# Patient Record
Sex: FEMALE | Race: WHITE | Employment: OTHER | URBAN - METROPOLITAN AREA
[De-identification: names, ages, dates, MRNs, and addresses within clinical notes are randomized per-mention and may not be internally consistent; named-entity substitution may affect disease eponyms.]

---

## 2023-03-07 DIAGNOSIS — M86.9: Primary | ICD-10-CM

## 2023-03-07 RX ORDER — OXYCODONE HYDROCHLORIDE 5 MG/1
10 CAPSULE ORAL EVERY 8 HOURS PRN
COMMUNITY
End: 2023-03-07 | Stop reason: SDUPTHER

## 2023-03-07 RX ORDER — OXYCODONE HYDROCHLORIDE 5 MG/1
10 CAPSULE ORAL EVERY 8 HOURS PRN
Qty: 180 CAPSULE | Refills: 0 | Status: SHIPPED | OUTPATIENT
Start: 2023-03-07 | End: 2023-05-09 | Stop reason: ALTCHOICE

## 2023-03-09 ENCOUNTER — NURSING HOME VISIT (OUTPATIENT)
Dept: POST ACUTE CARE | Facility: EXTERNAL LOCATION | Age: 68
End: 2023-03-09
Payer: MEDICARE

## 2023-03-09 VITALS
TEMPERATURE: 97.9 F | OXYGEN SATURATION: 98 % | SYSTOLIC BLOOD PRESSURE: 120 MMHG | HEART RATE: 75 BPM | WEIGHT: 182 LBS | RESPIRATION RATE: 18 BRPM | DIASTOLIC BLOOD PRESSURE: 69 MMHG | BODY MASS INDEX: 28.51 KG/M2

## 2023-03-09 DIAGNOSIS — E87.6 HYPOKALEMIA: ICD-10-CM

## 2023-03-09 DIAGNOSIS — K26.4 GASTROINTESTINAL HEMORRHAGE ASSOCIATED WITH DUODENAL ULCER: ICD-10-CM

## 2023-03-09 DIAGNOSIS — I48.0 PAROXYSMAL ATRIAL FIBRILLATION (MULTI): ICD-10-CM

## 2023-03-09 DIAGNOSIS — Z95.1 S/P CABG (CORONARY ARTERY BYPASS GRAFT): ICD-10-CM

## 2023-03-09 DIAGNOSIS — M86.9: Primary | ICD-10-CM

## 2023-03-09 PROBLEM — K92.2 GI BLEED: Status: ACTIVE | Noted: 2023-03-09

## 2023-03-09 PROCEDURE — 99309 SBSQ NF CARE MODERATE MDM 30: CPT | Performed by: NURSE PRACTITIONER

## 2023-03-09 ASSESSMENT — ENCOUNTER SYMPTOMS
VOMITING: 0
SHORTNESS OF BREATH: 0
FEVER: 0
ABDOMINAL PAIN: 0
DIARRHEA: 0
COUGH: 0
CHILLS: 0
NAUSEA: 0
CONSTIPATION: 0
PALPITATIONS: 0
DIFFICULTY URINATING: 0
FATIGUE: 0

## 2023-03-09 NOTE — LETTER
Subjective  Rupa Pinedo is a 67 y.o. female Here for weekly skilled visit.  Health problems reviewed and no acute concerns.  Tolerating IV antibiotics without apparent side effects.   Eating and drinking well.  she denies any complaints of pain.  No concerns per staff.      HPI   Dr Frias has been forwarded pictures through [facility] and plan is to continue atb and will reevaluate next week.      Review of Systems   Constitutional:  Negative for chills, fatigue and fever.   Respiratory:  Negative for cough and shortness of breath.    Cardiovascular:  Negative for chest pain and palpitations.   Gastrointestinal:  Negative for abdominal pain, constipation, diarrhea, nausea and vomiting.   Genitourinary:  Negative for difficulty urinating.       Objective  /69   Pulse 75   Temp 36.6 °C (97.9 °F)   Resp 18   Wt 82.6 kg (182 lb)   SpO2 98%   BMI 28.51 kg/m²     Physical Exam  Constitutional:       General: She is not in acute distress.     Appearance: Normal appearance.   HENT:      Head: Normocephalic and atraumatic.   Eyes:      Conjunctiva/sclera: Conjunctivae normal.   Cardiovascular:      Rate and Rhythm: Normal rate and regular rhythm.   Pulmonary:      Effort: Pulmonary effort is normal. No respiratory distress.      Breath sounds: Normal breath sounds.      Comments: Slightly diminshed t/o  Abdominal:      General: Bowel sounds are normal. There is no distension.      Palpations: Abdomen is soft.      Tenderness: There is no abdominal tenderness.   Musculoskeletal:         General: Normal range of motion.      Right lower leg: Edema (2+) present.      Left lower leg: Edema (2+) present.   Skin:     General: Skin is warm and dry.      Comments: Sternal wound vac intact.    Neurological:      General: No focal deficit present.      Mental Status: She is alert and oriented to person, place, and time.   Psychiatric:         Mood and Affect: Mood normal.         Behavior: Behavior normal.          Assessment/Plan  Problem List Items Addressed This Visit          Circulatory    S/P CABG (coronary artery bypass graft)     Follow up cardiology and CTS as scheduled.           Paroxysmal atrial fibrillation (CMS/HCC)     Rate controlled,  anticoagulants discontinued due to gi bleed.  On metoprolol.             Digestive    GI bleed     No known recurrence, hgb stable on weekly labs. Staff to monitor and notify for any recurrence of symptoms.                Musculoskeletal    Sternal osteomyelitis (CMS/HCC) - Primary     Continue with atb and weekly labs per ID.     Dr Frias to determine stop date of atb.   Wound is decreasing in size    No pain.             Other    Hypokalemia     Potassium 3.5 today, goal potassium will be around 4.  Will given an extra 10 meq of kcl for 2 days and will have repeat potassium 3/13 with weekly labs.             labs/meds/orders reviewed  staff to monitor and notify for any changes.  Continue with IV atb as scheduled, stop date to be determined by Dr Frias.     Sandra SILVESTRE

## 2023-03-10 NOTE — ASSESSMENT & PLAN NOTE
Potassium 3.5 today, goal potassium will be around 4.  Will given an extra 10 meq of kcl for 2 days and will have repeat potassium 3/13 with weekly labs.

## 2023-03-10 NOTE — ASSESSMENT & PLAN NOTE
Continue with atb and weekly labs per ID.     Dr Frias to determine stop date of atb.   Wound is decreasing in size    No pain.

## 2023-03-10 NOTE — ASSESSMENT & PLAN NOTE
No known recurrence, hgb stable on weekly labs. Staff to monitor and notify for any recurrence of symptoms.

## 2023-03-16 ENCOUNTER — NURSING HOME VISIT (OUTPATIENT)
Dept: POST ACUTE CARE | Facility: EXTERNAL LOCATION | Age: 68
End: 2023-03-16
Payer: MEDICARE

## 2023-03-16 VITALS
HEART RATE: 75 BPM | RESPIRATION RATE: 18 BRPM | SYSTOLIC BLOOD PRESSURE: 120 MMHG | DIASTOLIC BLOOD PRESSURE: 69 MMHG | WEIGHT: 182 LBS | OXYGEN SATURATION: 97 % | BODY MASS INDEX: 28.51 KG/M2 | TEMPERATURE: 97.5 F

## 2023-03-16 DIAGNOSIS — K26.4 GASTROINTESTINAL HEMORRHAGE ASSOCIATED WITH DUODENAL ULCER: ICD-10-CM

## 2023-03-16 DIAGNOSIS — Z95.1 S/P CABG (CORONARY ARTERY BYPASS GRAFT): ICD-10-CM

## 2023-03-16 DIAGNOSIS — I48.0 PAROXYSMAL ATRIAL FIBRILLATION (MULTI): Primary | ICD-10-CM

## 2023-03-16 DIAGNOSIS — M86.9: ICD-10-CM

## 2023-03-16 DIAGNOSIS — E87.6 HYPOKALEMIA: ICD-10-CM

## 2023-03-16 PROCEDURE — 99309 SBSQ NF CARE MODERATE MDM 30: CPT | Performed by: NURSE PRACTITIONER

## 2023-03-16 ASSESSMENT — ENCOUNTER SYMPTOMS
FATIGUE: 0
DIFFICULTY URINATING: 0
CHILLS: 0
NAUSEA: 0
SHORTNESS OF BREATH: 0
FEVER: 0
PALPITATIONS: 0
VOMITING: 0
COUGH: 0
CONSTIPATION: 0
DIARRHEA: 0
ABDOMINAL PAIN: 0

## 2023-03-16 NOTE — ASSESSMENT & PLAN NOTE
No known recurrence  Hgb stable  on labs.  Staff to monitor and notify for any recurrence of symptoms.

## 2023-03-16 NOTE — ASSESSMENT & PLAN NOTE
Continue with atb and weekly labs per ID. Dr Frias to determine stop date of atb, this week adjusted to 3/31. . Wound is decreasing in size . No pain.

## 2023-03-16 NOTE — PROGRESS NOTES
Subjective   Rupa Pinedo is a 67 y.o. female Here for weekly skilled visit.  Health problems reviewed and no acute concerns.  Tolerating IV antibiotics without apparent side effects.   Eating and drinking well.  she denies any complaints of pain.  No concerns per staff.      HPI   Dr Frias has been forwarded pictures through [facility] and plan is to continue atb through 3/31.  Wound measurements continue to decrease.   Potassium 3.6 earlier this week,  BMP pending for today.     Review of Systems   Constitutional:  Negative for chills, fatigue and fever.   Respiratory:  Negative for cough and shortness of breath.    Cardiovascular:  Negative for chest pain and palpitations.   Gastrointestinal:  Negative for abdominal pain, constipation, diarrhea, nausea and vomiting.   Genitourinary:  Negative for difficulty urinating.       Objective   /69   Pulse 75   Temp 36.4 °C (97.5 °F)   Resp 18   Wt 82.6 kg (182 lb)   SpO2 97%   BMI 28.51 kg/m²     Physical Exam  Constitutional:       General: She is not in acute distress.     Appearance: Normal appearance.   HENT:      Head: Normocephalic and atraumatic.   Eyes:      Conjunctiva/sclera: Conjunctivae normal.   Cardiovascular:      Rate and Rhythm: Normal rate and regular rhythm.   Pulmonary:      Effort: Pulmonary effort is normal. No respiratory distress.      Breath sounds: Normal breath sounds.      Comments: Slightly diminshed t/o  Abdominal:      General: Bowel sounds are normal. There is no distension.      Palpations: Abdomen is soft.      Tenderness: There is no abdominal tenderness.   Musculoskeletal:         General: Normal range of motion.      Right lower leg: Edema (2+) present.      Left lower leg: Edema (2+) present.   Skin:     General: Skin is warm and dry.      Comments: Sternal wound vac intact.    Neurological:      General: No focal deficit present.      Mental Status: She is alert and oriented to person, place, and time.   Psychiatric:          Mood and Affect: Mood normal.         Behavior: Behavior normal.         Assessment/Plan   Problem List Items Addressed This Visit          Circulatory    S/P CABG (coronary artery bypass graft)     Follow up cardiology and CTS as scheduled.           Paroxysmal atrial fibrillation (CMS/HCC) - Primary       Digestive    GI bleed     No known recurrence  Hgb stable  on labs.  Staff to monitor and notify for any recurrence of symptoms.              Musculoskeletal    Sternal osteomyelitis (CMS/HCC)     Continue with atb and weekly labs per ID. Dr Frias to determine stop date of atb, this week adjusted to 3/31. . Wound is decreasing in size . No pain.             Other    Hypokalemia     Goal potassium around 4, earlier this week up to 3.6, repeat BMP pending for today.           labs/meds/orders reviewed  staff to monitor and notify for any changes.  Continue with IV atb as scheduled, stop date to be determined by Dr Frias, currently to continue through 3/31     Sandra SILVESTRE

## 2023-03-16 NOTE — LETTER
Patient: Rupa Pinedo  : 1955    Encounter Date: 2023    Subjective  Rupa Pinedo is a 67 y.o. female Here for weekly skilled visit.  Health problems reviewed and no acute concerns.  Tolerating IV antibiotics without apparent side effects.   Eating and drinking well.  she denies any complaints of pain.  No concerns per staff.      HPI   Dr Frias has been forwarded pictures through [facility] and plan is to continue atb through 3/31.  Wound measurements continue to decrease.   Potassium 3.6 earlier this week,  BMP pending for today.     Review of Systems   Constitutional:  Negative for chills, fatigue and fever.   Respiratory:  Negative for cough and shortness of breath.    Cardiovascular:  Negative for chest pain and palpitations.   Gastrointestinal:  Negative for abdominal pain, constipation, diarrhea, nausea and vomiting.   Genitourinary:  Negative for difficulty urinating.       Objective  /69   Pulse 75   Temp 36.4 °C (97.5 °F)   Resp 18   Wt 82.6 kg (182 lb)   SpO2 97%   BMI 28.51 kg/m²     Physical Exam  Constitutional:       General: She is not in acute distress.     Appearance: Normal appearance.   HENT:      Head: Normocephalic and atraumatic.   Eyes:      Conjunctiva/sclera: Conjunctivae normal.   Cardiovascular:      Rate and Rhythm: Normal rate and regular rhythm.   Pulmonary:      Effort: Pulmonary effort is normal. No respiratory distress.      Breath sounds: Normal breath sounds.      Comments: Slightly diminshed t/o  Abdominal:      General: Bowel sounds are normal. There is no distension.      Palpations: Abdomen is soft.      Tenderness: There is no abdominal tenderness.   Musculoskeletal:         General: Normal range of motion.      Right lower leg: Edema (2+) present.      Left lower leg: Edema (2+) present.   Skin:     General: Skin is warm and dry.      Comments: Sternal wound vac intact.    Neurological:      General: No focal deficit present.      Mental Status: She is  alert and oriented to person, place, and time.   Psychiatric:         Mood and Affect: Mood normal.         Behavior: Behavior normal.         Assessment/Plan  Problem List Items Addressed This Visit          Circulatory    S/P CABG (coronary artery bypass graft)     Follow up cardiology and CTS as scheduled.           Paroxysmal atrial fibrillation (CMS/HCC) - Primary       Digestive    GI bleed     No known recurrence  Hgb stable  on labs.  Staff to monitor and notify for any recurrence of symptoms.              Musculoskeletal    Sternal osteomyelitis (CMS/HCC)     Continue with atb and weekly labs per ID. Dr Frias to determine stop date of atb, this week adjusted to 3/31. . Wound is decreasing in size . No pain.             Other    Hypokalemia     Goal potassium around 4, earlier this week up to 3.6, repeat BMP pending for today.           labs/meds/orders reviewed  staff to monitor and notify for any changes.  Continue with IV atb as scheduled, stop date to be determined by Dr Frias, currently to continue through 3/31     Sandra SILVESTRE      Electronically Signed By: NIRMALA Nguyen-CNP   3/16/23 12:11 PM

## 2023-03-30 ENCOUNTER — NURSING HOME VISIT (OUTPATIENT)
Dept: POST ACUTE CARE | Facility: EXTERNAL LOCATION | Age: 68
End: 2023-03-30
Payer: MEDICARE

## 2023-03-30 VITALS
DIASTOLIC BLOOD PRESSURE: 63 MMHG | WEIGHT: 182 LBS | OXYGEN SATURATION: 97 % | TEMPERATURE: 97.6 F | BODY MASS INDEX: 28.51 KG/M2 | RESPIRATION RATE: 20 BRPM | SYSTOLIC BLOOD PRESSURE: 128 MMHG | HEART RATE: 69 BPM

## 2023-03-30 DIAGNOSIS — M86.9: Primary | ICD-10-CM

## 2023-03-30 DIAGNOSIS — E87.6 HYPOKALEMIA: ICD-10-CM

## 2023-03-30 DIAGNOSIS — K26.4 GASTROINTESTINAL HEMORRHAGE ASSOCIATED WITH DUODENAL ULCER: ICD-10-CM

## 2023-03-30 DIAGNOSIS — Z95.1 S/P CABG (CORONARY ARTERY BYPASS GRAFT): ICD-10-CM

## 2023-03-30 DIAGNOSIS — I48.0 PAROXYSMAL ATRIAL FIBRILLATION (MULTI): ICD-10-CM

## 2023-03-30 PROCEDURE — 99309 SBSQ NF CARE MODERATE MDM 30: CPT | Performed by: NURSE PRACTITIONER

## 2023-03-30 ASSESSMENT — ENCOUNTER SYMPTOMS
VOMITING: 0
SHORTNESS OF BREATH: 0
CHILLS: 0
DIARRHEA: 0
CONSTIPATION: 0
ABDOMINAL PAIN: 0
COUGH: 0
FEVER: 0
PALPITATIONS: 0
NAUSEA: 0
DIFFICULTY URINATING: 0
FATIGUE: 0

## 2023-03-30 NOTE — ASSESSMENT & PLAN NOTE
Continue with atb and weekly labs per ID. Dr Frias to determine stop date of atb, this week daptomycin will be completed 3/31, cefepime will continue tentatively through 4/7 . Wound is decreasing in size . No pain.

## 2023-03-30 NOTE — LETTER
Patient: Rupa Pinedo  : 1955    Encounter Date: 2023    Subjective  Rupa Pinedo is a 67 y.o. female Here for weekly skilled visit.  Health problems reviewed and no acute concerns.  Tolerating IV antibiotics without apparent side effects.   Eating and drinking well.  she denies any complaints of pain.  No concerns per staff.      HPI   Dr Frias has been forwarded pictures through [facility] and plan is to continue cefeprime tentaively through , daptomycin will be completed 3/31.  Wound measurements continue to decrease.   Potassium 3.2earlier this week, given extra potassium, BMP pending for today.     Review of Systems   Constitutional:  Negative for chills, fatigue and fever.   Respiratory:  Negative for cough and shortness of breath.    Cardiovascular:  Negative for chest pain and palpitations.   Gastrointestinal:  Negative for abdominal pain, constipation, diarrhea, nausea and vomiting.   Genitourinary:  Negative for difficulty urinating.       Objective  /63   Pulse 69   Temp 36.4 °C (97.6 °F)   Resp 20   Wt 82.6 kg (182 lb)   SpO2 97%   BMI 28.51 kg/m²     Physical Exam  Constitutional:       General: She is not in acute distress.     Appearance: Normal appearance.   HENT:      Head: Normocephalic and atraumatic.   Eyes:      Conjunctiva/sclera: Conjunctivae normal.   Cardiovascular:      Rate and Rhythm: Normal rate and regular rhythm.   Pulmonary:      Effort: Pulmonary effort is normal. No respiratory distress.      Breath sounds: Normal breath sounds.      Comments: Slightly diminshed t/o  Abdominal:      General: Bowel sounds are normal. There is no distension.      Palpations: Abdomen is soft.      Tenderness: There is no abdominal tenderness.   Musculoskeletal:         General: Normal range of motion.      Right lower leg: Edema (2+) present.      Left lower leg: Edema (2+) present.   Skin:     General: Skin is warm and dry.      Comments: Sternal wound vac intact.     Neurological:      General: No focal deficit present.      Mental Status: She is alert and oriented to person, place, and time.   Psychiatric:         Mood and Affect: Mood normal.         Behavior: Behavior normal.         Assessment/Plan  Problem List Items Addressed This Visit          Circulatory    S/P CABG (coronary artery bypass graft)     Follow up cardiology and CTS as scheduled.           Paroxysmal atrial fibrillation (CMS/HCC)     Rate controlled,  anticoagulants discontinued due to gi bleed.  On metoprolol.             Digestive    GI bleed       Musculoskeletal    Sternal osteomyelitis (CMS/HCC) - Primary     Continue with atb and weekly labs per ID. Dr Frias to determine stop date of atb, this week daptomycin will be completed 3/31, cefepime will continue tentatively through 4/7 . Wound is decreasing in size . No pain.             Other    Hypokalemia     Goal potassium around 4,, earlier this week dropped to 3.2 and given extra potassium, repeat pending for today           labs/meds/orders reviewed  staff to monitor and notify for any changes.  Continue with IV atb as scheduled, stop date to be determined by Dr Frias, currently to continue daptomycin through 3/31  and cefipime tentatively complete through 4/7.           Electronically Signed By: NIRMALA Nguyen-CNP   3/30/23 11:41 AM

## 2023-03-30 NOTE — PROGRESS NOTES
Subjective   Rupa Pinedo is a 67 y.o. female Here for weekly skilled visit.  Health problems reviewed and no acute concerns.  Tolerating IV antibiotics without apparent side effects.   Eating and drinking well.  she denies any complaints of pain.  No concerns per staff.      HPI   Dr Frias has been forwarded pictures through [facility] and plan is to continue cefeprime tentaively through 4/7, daptomycin will be completed 3/31.  Wound measurements continue to decrease.   Potassium 3.2earlier this week, given extra potassium, BMP pending for today.     Review of Systems   Constitutional:  Negative for chills, fatigue and fever.   Respiratory:  Negative for cough and shortness of breath.    Cardiovascular:  Negative for chest pain and palpitations.   Gastrointestinal:  Negative for abdominal pain, constipation, diarrhea, nausea and vomiting.   Genitourinary:  Negative for difficulty urinating.       Objective   /63   Pulse 69   Temp 36.4 °C (97.6 °F)   Resp 20   Wt 82.6 kg (182 lb)   SpO2 97%   BMI 28.51 kg/m²     Physical Exam  Constitutional:       General: She is not in acute distress.     Appearance: Normal appearance.   HENT:      Head: Normocephalic and atraumatic.   Eyes:      Conjunctiva/sclera: Conjunctivae normal.   Cardiovascular:      Rate and Rhythm: Normal rate and regular rhythm.   Pulmonary:      Effort: Pulmonary effort is normal. No respiratory distress.      Breath sounds: Normal breath sounds.      Comments: Slightly diminshed t/o  Abdominal:      General: Bowel sounds are normal. There is no distension.      Palpations: Abdomen is soft.      Tenderness: There is no abdominal tenderness.   Musculoskeletal:         General: Normal range of motion.      Right lower leg: Edema (2+) present.      Left lower leg: Edema (2+) present.   Skin:     General: Skin is warm and dry.      Comments: Sternal wound vac intact.    Neurological:      General: No focal deficit present.      Mental Status: She  is alert and oriented to person, place, and time.   Psychiatric:         Mood and Affect: Mood normal.         Behavior: Behavior normal.         Assessment/Plan   Problem List Items Addressed This Visit          Circulatory    S/P CABG (coronary artery bypass graft)     Follow up cardiology and CTS as scheduled.           Paroxysmal atrial fibrillation (CMS/HCC)     Rate controlled,  anticoagulants discontinued due to gi bleed.  On metoprolol.             Digestive    GI bleed       Musculoskeletal    Sternal osteomyelitis (CMS/HCC) - Primary     Continue with atb and weekly labs per ID. Dr Frias to determine stop date of atb, this week daptomycin will be completed 3/31, cefepime will continue tentatively through 4/7 . Wound is decreasing in size . No pain.             Other    Hypokalemia     Goal potassium around 4,, earlier this week dropped to 3.2 and given extra potassium, repeat pending for today           labs/meds/orders reviewed  staff to monitor and notify for any changes.  Continue with IV atb as scheduled, stop date to be determined by Dr Frias, currently to continue daptomycin through 3/31  and cefipime tentatively complete through 4/7.

## 2023-03-30 NOTE — ASSESSMENT & PLAN NOTE
Goal potassium around 4,, earlier this week dropped to 3.2 and given extra potassium, repeat pending for today

## 2023-04-06 ENCOUNTER — NURSING HOME VISIT (OUTPATIENT)
Dept: POST ACUTE CARE | Facility: EXTERNAL LOCATION | Age: 68
End: 2023-04-06
Payer: MEDICARE

## 2023-04-06 VITALS
RESPIRATION RATE: 20 BRPM | SYSTOLIC BLOOD PRESSURE: 128 MMHG | WEIGHT: 184 LBS | TEMPERATURE: 97.9 F | OXYGEN SATURATION: 97 % | DIASTOLIC BLOOD PRESSURE: 63 MMHG | HEART RATE: 69 BPM | BODY MASS INDEX: 28.82 KG/M2

## 2023-04-06 DIAGNOSIS — Z95.1 S/P CABG (CORONARY ARTERY BYPASS GRAFT): ICD-10-CM

## 2023-04-06 DIAGNOSIS — I48.0 PAROXYSMAL ATRIAL FIBRILLATION (MULTI): ICD-10-CM

## 2023-04-06 DIAGNOSIS — E87.6 HYPOKALEMIA: ICD-10-CM

## 2023-04-06 DIAGNOSIS — M86.9: Primary | ICD-10-CM

## 2023-04-06 DIAGNOSIS — K26.4 GASTROINTESTINAL HEMORRHAGE ASSOCIATED WITH DUODENAL ULCER: ICD-10-CM

## 2023-04-06 PROCEDURE — 99309 SBSQ NF CARE MODERATE MDM 30: CPT | Performed by: NURSE PRACTITIONER

## 2023-04-06 ASSESSMENT — ENCOUNTER SYMPTOMS
NAUSEA: 0
COUGH: 0
PALPITATIONS: 0
DIARRHEA: 0
DIFFICULTY URINATING: 0
CHILLS: 0
FEVER: 0
SHORTNESS OF BREATH: 0
FATIGUE: 0
VOMITING: 0
CONSTIPATION: 0
ABDOMINAL PAIN: 0

## 2023-04-06 NOTE — LETTER
Patient: Rupa Pinedo  : 1955    Encounter Date: 2023    Subjective  Rupa Pinedo is a 67 y.o. female Here for weekly skilled visit.  Health problems reviewed and no acute concerns.  Tolerating IV antibiotics without apparent side effects, these will be completed tomorrow.  She is planning on discharge  and feels ready to go home.   Eating and drinking well.  she denies any complaints of pain except when wound vac dressing is changed.   No concerns per staff.      HPI   Dr Frias has been forwarded pictures through [facility] and cefepime will be completed tomorrow. .  Wound measurements continue to decrease.       Review of Systems   Constitutional:  Negative for chills, fatigue and fever.   Respiratory:  Negative for cough and shortness of breath.    Cardiovascular:  Negative for chest pain and palpitations.   Gastrointestinal:  Negative for abdominal pain, constipation, diarrhea, nausea and vomiting.   Genitourinary:  Negative for difficulty urinating.       Objective  /63   Pulse 69   Temp 36.6 °C (97.9 °F)   Resp 20   Wt 83.5 kg (184 lb)   SpO2 97%   BMI 28.82 kg/m²     Physical Exam  Constitutional:       General: She is not in acute distress.     Appearance: Normal appearance.   HENT:      Head: Normocephalic and atraumatic.   Eyes:      Conjunctiva/sclera: Conjunctivae normal.   Cardiovascular:      Rate and Rhythm: Normal rate and regular rhythm.   Pulmonary:      Effort: Pulmonary effort is normal. No respiratory distress.      Breath sounds: Normal breath sounds.      Comments: Slightly diminshed t/o  Abdominal:      General: Bowel sounds are normal. There is no distension.      Palpations: Abdomen is soft.      Tenderness: There is no abdominal tenderness.   Musculoskeletal:         General: Normal range of motion.      Right lower leg: Edema (2+) present.      Left lower leg: Edema (2+) present.   Skin:     General: Skin is warm and dry.      Comments: Sternal wound vac intact.     Neurological:      General: No focal deficit present.      Mental Status: She is alert and oriented to person, place, and time.   Psychiatric:         Mood and Affect: Mood normal.         Behavior: Behavior normal.         Assessment/Plan  Problem List Items Addressed This Visit          Circulatory    S/P CABG (coronary artery bypass graft)     Follow up cardiology and CTS as scheduled.    Wound vac is in place, open wound decreasing in size.          Paroxysmal atrial fibrillation (CMS/HCC)     Rate controlled,  anticoagulants discontinued due to gi bleed.  On metoprolol.              Digestive    GI bleed     No known recurrence.   Staff to monitor and notify for any recurrence of symptoms.              Musculoskeletal    Sternal osteomyelitis (CMS/HCC) - Primary     Cefepime will be completed tomorrow, she will be discharged 4/8.  She has wound care set up in the home.   Wound is decreasing in size . No pain.             Other    Hypokalemia     No results available from this week's labs.           labs/meds/orders reviewed  staff to monitor and notify for any changes.  Acceptable for discharge.  She already has wound care set up in home.  She will follow with Dr Montero in 1-2 weeks  Scripts left in anticipation of discharge           Electronically Signed By: JESSI Nguyen   4/6/23  2:55 PM

## 2023-04-06 NOTE — PROGRESS NOTES
Subjective   Rupa Pinedo is a 67 y.o. female Here for weekly skilled visit.  Health problems reviewed and no acute concerns.  Tolerating IV antibiotics without apparent side effects, these will be completed tomorrow.  She is planning on discharge 4/8 and feels ready to go home.   Eating and drinking well.  she denies any complaints of pain except when wound vac dressing is changed.   No concerns per staff.      HPI   Dr Frias has been forwarded pictures through [employer] and cefepime will be completed tomorrow. .  Wound measurements continue to decrease.       Review of Systems   Constitutional:  Negative for chills, fatigue and fever.   Respiratory:  Negative for cough and shortness of breath.    Cardiovascular:  Negative for chest pain and palpitations.   Gastrointestinal:  Negative for abdominal pain, constipation, diarrhea, nausea and vomiting.   Genitourinary:  Negative for difficulty urinating.       Objective   /63   Pulse 69   Temp 36.6 °C (97.9 °F)   Resp 20   Wt 83.5 kg (184 lb)   SpO2 97%   BMI 28.82 kg/m²     Physical Exam  Constitutional:       General: She is not in acute distress.     Appearance: Normal appearance.   HENT:      Head: Normocephalic and atraumatic.   Eyes:      Conjunctiva/sclera: Conjunctivae normal.   Cardiovascular:      Rate and Rhythm: Normal rate and regular rhythm.   Pulmonary:      Effort: Pulmonary effort is normal. No respiratory distress.      Breath sounds: Normal breath sounds.      Comments: Slightly diminshed t/o  Abdominal:      General: Bowel sounds are normal. There is no distension.      Palpations: Abdomen is soft.      Tenderness: There is no abdominal tenderness.   Musculoskeletal:         General: Normal range of motion.      Right lower leg: Edema (2+) present.      Left lower leg: Edema (2+) present.   Skin:     General: Skin is warm and dry.      Comments: Sternal wound vac intact.    Neurological:      General: No focal deficit present.      Mental  Status: She is alert and oriented to person, place, and time.   Psychiatric:         Mood and Affect: Mood normal.         Behavior: Behavior normal.         Assessment/Plan   Problem List Items Addressed This Visit          Circulatory    S/P CABG (coronary artery bypass graft)     Follow up cardiology and CTS as scheduled.    Wound vac is in place, open wound decreasing in size.          Paroxysmal atrial fibrillation (CMS/HCC)     Rate controlled,  anticoagulants discontinued due to gi bleed.  On metoprolol.              Digestive    GI bleed     No known recurrence.   Staff to monitor and notify for any recurrence of symptoms.              Musculoskeletal    Sternal osteomyelitis (CMS/HCC) - Primary     Cefepime will be completed tomorrow, she will be discharged 4/8.  She has wound care set up in the home.   Wound is decreasing in size . No pain.             Other    Hypokalemia     No results available from this week's labs.           labs/meds/orders reviewed  staff to monitor and notify for any changes.  Acceptable for discharge.  She already has wound care set up in home.  She will follow with Dr Montero in 1-2 weeks  Scripts left in anticipation of discharge

## 2023-04-06 NOTE — ASSESSMENT & PLAN NOTE
Cefepime will be completed tomorrow, she will be discharged 4/8.  She has wound care set up in the home.   Wound is decreasing in size . No pain.

## 2023-04-06 NOTE — ASSESSMENT & PLAN NOTE
Follow up cardiology and CTS as scheduled.    Wound vac is in place, open wound decreasing in size.

## 2023-04-07 LAB
ANION GAP IN SER/PLAS: 11 MMOL/L (ref 10–20)
CALCIUM (MG/DL) IN SER/PLAS: 9.5 MG/DL (ref 8.6–10.3)
CARBON DIOXIDE, TOTAL (MMOL/L) IN SER/PLAS: 29 MMOL/L (ref 21–32)
CHLORIDE (MMOL/L) IN SER/PLAS: 103 MMOL/L (ref 98–107)
CREATININE (MG/DL) IN SER/PLAS: 0.77 MG/DL (ref 0.5–1.05)
GFR FEMALE: 83 ML/MIN/1.73M2
GLUCOSE (MG/DL) IN SER/PLAS: 126 MG/DL (ref 74–99)
POTASSIUM (MMOL/L) IN SER/PLAS: 4 MMOL/L (ref 3.5–5.3)
SODIUM (MMOL/L) IN SER/PLAS: 139 MMOL/L (ref 136–145)
UREA NITROGEN (MG/DL) IN SER/PLAS: 13 MG/DL (ref 6–23)

## 2023-04-12 ENCOUNTER — DOCUMENTATION (OUTPATIENT)
Dept: PRIMARY CARE | Facility: CLINIC | Age: 68
End: 2023-04-12
Payer: MEDICARE

## 2023-04-12 ENCOUNTER — PATIENT OUTREACH (OUTPATIENT)
Dept: CARE COORDINATION | Facility: CLINIC | Age: 68
End: 2023-04-12
Payer: MEDICARE

## 2023-04-12 NOTE — PROGRESS NOTES
Discharge Facility: [facility]  Discharge Diagnosis: Chest pain  Admission Date: 2/23/2023  Discharge Date: 4/11/2023    PCP appt: Not scheduled. Unable to reach patient x2. Message sent to clinical pool to follow up with patient.    Pt was a readmission to [facility] from 2/21/2023-2/23/2023. Pt discharged from [facility] 4/11.

## 2023-04-19 LAB
ANION GAP IN SER/PLAS: 12 MMOL/L (ref 10–20)
CALCIUM (MG/DL) IN SER/PLAS: 10.1 MG/DL (ref 8.6–10.3)
CARBON DIOXIDE, TOTAL (MMOL/L) IN SER/PLAS: 28 MMOL/L (ref 21–32)
CHLORIDE (MMOL/L) IN SER/PLAS: 100 MMOL/L (ref 98–107)
CREATININE (MG/DL) IN SER/PLAS: 0.82 MG/DL (ref 0.5–1.05)
GFR FEMALE: 77 ML/MIN/1.73M2
GLUCOSE (MG/DL) IN SER/PLAS: 88 MG/DL (ref 74–99)
POTASSIUM (MMOL/L) IN SER/PLAS: 3.7 MMOL/L (ref 3.5–5.3)
SODIUM (MMOL/L) IN SER/PLAS: 136 MMOL/L (ref 136–145)
UREA NITROGEN (MG/DL) IN SER/PLAS: 18 MG/DL (ref 6–23)

## 2023-04-24 ENCOUNTER — PATIENT OUTREACH (OUTPATIENT)
Dept: CARE COORDINATION | Facility: CLINIC | Age: 68
End: 2023-04-24
Payer: MEDICARE

## 2023-04-24 NOTE — PROGRESS NOTES
14 day callback after pt discharged home from [facility] [address].  At time of outreach call the patient feels as if their condition has improved since last visit.  Pt reports [facility] nurses coming out MWF to do dressing changes as she has had the wound vac removed. Per patient [facility] nurse to come today to change dressing and take picture to send to the ID doctor. Pt states she is getting around good and denies any needs or concerns. Pt states she has appt with cardiologist tomorrow and will be seeing PCP 5/9.

## 2023-05-08 PROBLEM — T81.32XA DISRUPTION OR DEHISCENCE OF CLOSURE OF STERNUM OR STERNOTOMY: Status: ACTIVE | Noted: 2023-05-08

## 2023-05-08 PROBLEM — K20.90 ESOPHAGITIS, UNSPECIFIED WITHOUT BLEEDING: Status: ACTIVE | Noted: 2023-02-07

## 2023-05-08 PROBLEM — R19.7 DIARRHEA: Status: ACTIVE | Noted: 2023-05-08

## 2023-05-08 PROBLEM — R65.20 SEVERE SEPSIS WITHOUT SEPTIC SHOCK (MULTI): Status: ACTIVE | Noted: 2023-02-07

## 2023-05-08 PROBLEM — B96.20 E COLI BACTEREMIA: Status: ACTIVE | Noted: 2023-05-08

## 2023-05-08 PROBLEM — Z20.822 CONTACT WITH AND (SUSPECTED) EXPOSURE TO COVID-19: Status: ACTIVE | Noted: 2022-10-03

## 2023-05-08 PROBLEM — K21.9 GERD (GASTROESOPHAGEAL REFLUX DISEASE): Status: ACTIVE | Noted: 2023-05-08

## 2023-05-08 PROBLEM — T81.328A DISRUPTION OR DEHISCENCE OF CLOSURE OF STERNUM OR STERNOTOMY: Status: ACTIVE | Noted: 2023-05-08

## 2023-05-08 PROBLEM — B96.5 BACTEREMIA DUE TO PSEUDOMONAS: Status: ACTIVE | Noted: 2023-05-08

## 2023-05-08 PROBLEM — I25.10 CORONARY ARTERY DISEASE INVOLVING NATIVE CORONARY ARTERY OF NATIVE HEART WITHOUT ANGINA PECTORIS: Status: ACTIVE | Noted: 2022-09-20

## 2023-05-08 PROBLEM — D62 ACUTE POSTHEMORRHAGIC ANEMIA: Status: ACTIVE | Noted: 2023-02-07

## 2023-05-08 PROBLEM — R50.9 FEVER: Status: ACTIVE | Noted: 2023-05-08

## 2023-05-08 PROBLEM — T36.8X5A: Status: ACTIVE | Noted: 2023-02-07

## 2023-05-08 PROBLEM — R06.02 SHORTNESS OF BREATH: Status: ACTIVE | Noted: 2022-09-19

## 2023-05-08 PROBLEM — Z90.10 ACQUIRED ABSENCE OF UNSPECIFIED BREAST AND NIPPLE: Status: ACTIVE | Noted: 2023-02-07

## 2023-05-08 PROBLEM — I87.2 CHRONIC VENOUS INSUFFICIENCY: Status: ACTIVE | Noted: 2023-05-08

## 2023-05-08 PROBLEM — N28.1 CYST OF KIDNEY, ACQUIRED: Status: ACTIVE | Noted: 2023-02-07

## 2023-05-08 PROBLEM — I10 ESSENTIAL HYPERTENSION: Status: ACTIVE | Noted: 2022-10-11

## 2023-05-08 PROBLEM — R60.9 DEPENDENT EDEMA: Status: ACTIVE | Noted: 2023-05-08

## 2023-05-08 PROBLEM — E87.70 FLUID OVERLOAD: Status: ACTIVE | Noted: 2022-12-31

## 2023-05-08 PROBLEM — R78.81 E COLI BACTEREMIA: Status: ACTIVE | Noted: 2023-05-08

## 2023-05-08 PROBLEM — R07.89 ATYPICAL CHEST PAIN: Status: ACTIVE | Noted: 2023-05-08

## 2023-05-08 PROBLEM — N17.9 ACUTE KIDNEY FAILURE, UNSPECIFIED (CMS-HCC): Status: ACTIVE | Noted: 2023-02-23

## 2023-05-08 PROBLEM — R74.01 ELEVATION OF LEVELS OF LIVER TRANSAMINASE LEVELS: Status: RESOLVED | Noted: 2023-01-30 | Resolved: 2023-05-08

## 2023-05-08 PROBLEM — E86.0 DEHYDRATION: Status: ACTIVE | Noted: 2023-02-07

## 2023-05-08 PROBLEM — R78.81 BACTEREMIA DUE TO PSEUDOMONAS: Status: ACTIVE | Noted: 2023-05-08

## 2023-05-08 PROBLEM — R06.00 DYSPNEA, UNSPECIFIED: Status: ACTIVE | Noted: 2022-09-19

## 2023-05-08 PROBLEM — E87.20 ACIDOSIS, UNSPECIFIED: Status: ACTIVE | Noted: 2023-02-07

## 2023-05-08 PROBLEM — A41.9 SEVERE SEPSIS WITHOUT SEPTIC SHOCK (MULTI): Status: ACTIVE | Noted: 2023-02-07

## 2023-05-08 PROBLEM — E89.0 POSTPROCEDURAL HYPOTHYROIDISM: Status: ACTIVE | Noted: 2023-02-07

## 2023-05-08 PROBLEM — I50.31 ACUTE DIASTOLIC (CONGESTIVE) HEART FAILURE (MULTI): Status: ACTIVE | Noted: 2022-10-11

## 2023-05-08 PROBLEM — R63.0 ANOREXIA: Status: ACTIVE | Noted: 2023-05-08

## 2023-05-08 PROBLEM — A41.9 SEPSIS, UNSPECIFIED ORGANISM (MULTI): Status: ACTIVE | Noted: 2023-02-07

## 2023-05-08 PROBLEM — R74.02 ELEVATION OF LEVELS OF LACTIC ACID DEHYDROGENASE (LDH): Status: ACTIVE | Noted: 2023-01-30

## 2023-05-08 RX ORDER — FUROSEMIDE 20 MG/1
30 TABLET ORAL
COMMUNITY
Start: 2022-11-16 | End: 2023-05-09 | Stop reason: ALTCHOICE

## 2023-05-08 RX ORDER — ATORVASTATIN CALCIUM 80 MG/1
80 TABLET, FILM COATED ORAL
COMMUNITY
Start: 2022-09-13

## 2023-05-08 RX ORDER — SELENIUM 50 MCG
TABLET ORAL 2 TIMES DAILY
COMMUNITY
End: 2023-05-09 | Stop reason: SDUPTHER

## 2023-05-08 RX ORDER — ACETAMINOPHEN 325 MG/1
TABLET ORAL EVERY 4 HOURS PRN
COMMUNITY
Start: 2023-01-05 | End: 2024-01-02 | Stop reason: ALTCHOICE

## 2023-05-08 RX ORDER — LISINOPRIL 10 MG/1
1 TABLET ORAL DAILY
COMMUNITY
Start: 2022-12-16

## 2023-05-08 RX ORDER — AMLODIPINE BESYLATE 10 MG/1
10 TABLET ORAL
COMMUNITY
Start: 2023-01-06 | End: 2023-05-09 | Stop reason: ALTCHOICE

## 2023-05-08 RX ORDER — AMIODARONE HYDROCHLORIDE 200 MG/1
200 TABLET ORAL
COMMUNITY
Start: 2022-10-12 | End: 2023-05-09 | Stop reason: ALTCHOICE

## 2023-05-08 RX ORDER — LISINOPRIL 10 MG/1
10 TABLET ORAL DAILY
COMMUNITY
End: 2023-05-09 | Stop reason: SDUPTHER

## 2023-05-08 RX ORDER — ASPIRIN 81 MG/1
TABLET ORAL
COMMUNITY
Start: 2022-10-12

## 2023-05-08 RX ORDER — AMLODIPINE BESYLATE 10 MG/1
1 TABLET ORAL DAILY
COMMUNITY
Start: 2023-04-06 | End: 2023-05-09 | Stop reason: ALTCHOICE

## 2023-05-08 RX ORDER — LANOLIN ALCOHOL/MO/W.PET/CERES
1 CREAM (GRAM) TOPICAL DAILY
COMMUNITY
Start: 2022-12-16 | End: 2023-05-09 | Stop reason: ALTCHOICE

## 2023-05-08 RX ORDER — FUROSEMIDE 40 MG/1
40 TABLET ORAL
COMMUNITY
Start: 2022-10-12 | End: 2023-05-09 | Stop reason: ALTCHOICE

## 2023-05-08 RX ORDER — LISINOPRIL 5 MG/1
5 TABLET ORAL
COMMUNITY
Start: 2023-01-06 | End: 2023-05-09 | Stop reason: DRUGHIGH

## 2023-05-08 RX ORDER — AMLODIPINE BESYLATE 5 MG/1
5 TABLET ORAL
COMMUNITY
Start: 2022-11-16 | End: 2023-05-09 | Stop reason: ALTCHOICE

## 2023-05-08 RX ORDER — GABAPENTIN 100 MG/1
1 CAPSULE ORAL 2 TIMES DAILY
COMMUNITY
Start: 2022-12-16 | End: 2023-08-28 | Stop reason: ALTCHOICE

## 2023-05-08 RX ORDER — LISINOPRIL 20 MG/1
20 TABLET ORAL DAILY
COMMUNITY
End: 2023-05-09 | Stop reason: DRUGHIGH

## 2023-05-08 RX ORDER — ESOMEPRAZOLE MAGNESIUM 40 MG/1
40 CAPSULE, DELAYED RELEASE ORAL
COMMUNITY
Start: 2022-10-10 | End: 2023-05-09 | Stop reason: ALTCHOICE

## 2023-05-08 RX ORDER — LOPERAMIDE HYDROCHLORIDE 2 MG/1
CAPSULE ORAL EVERY 6 HOURS PRN
COMMUNITY
End: 2023-05-09 | Stop reason: ALTCHOICE

## 2023-05-08 RX ORDER — DOCUSATE SODIUM 100 MG/1
100 CAPSULE, LIQUID FILLED ORAL 2 TIMES DAILY
COMMUNITY
Start: 2022-10-09 | End: 2023-05-09 | Stop reason: ALTCHOICE

## 2023-05-09 ENCOUNTER — OFFICE VISIT (OUTPATIENT)
Dept: PRIMARY CARE | Facility: CLINIC | Age: 68
End: 2023-05-09
Payer: MEDICARE

## 2023-05-09 VITALS
HEIGHT: 67 IN | BODY MASS INDEX: 27.31 KG/M2 | SYSTOLIC BLOOD PRESSURE: 124 MMHG | WEIGHT: 174 LBS | RESPIRATION RATE: 14 BRPM | HEART RATE: 71 BPM | DIASTOLIC BLOOD PRESSURE: 72 MMHG | OXYGEN SATURATION: 96 %

## 2023-05-09 DIAGNOSIS — K21.9 GASTROESOPHAGEAL REFLUX DISEASE, UNSPECIFIED WHETHER ESOPHAGITIS PRESENT: ICD-10-CM

## 2023-05-09 DIAGNOSIS — I48.0 PAROXYSMAL ATRIAL FIBRILLATION (MULTI): ICD-10-CM

## 2023-05-09 DIAGNOSIS — Z95.1 S/P CABG (CORONARY ARTERY BYPASS GRAFT): Primary | ICD-10-CM

## 2023-05-09 DIAGNOSIS — E87.6 HYPOKALEMIA: ICD-10-CM

## 2023-05-09 DIAGNOSIS — M86.9: ICD-10-CM

## 2023-05-09 PROCEDURE — 3074F SYST BP LT 130 MM HG: CPT | Performed by: INTERNAL MEDICINE

## 2023-05-09 PROCEDURE — 1159F MED LIST DOCD IN RCRD: CPT | Performed by: INTERNAL MEDICINE

## 2023-05-09 PROCEDURE — 3078F DIAST BP <80 MM HG: CPT | Performed by: INTERNAL MEDICINE

## 2023-05-09 PROCEDURE — 3008F BODY MASS INDEX DOCD: CPT | Performed by: INTERNAL MEDICINE

## 2023-05-09 PROCEDURE — 99214 OFFICE O/P EST MOD 30 MIN: CPT | Performed by: INTERNAL MEDICINE

## 2023-05-09 RX ORDER — POTASSIUM CHLORIDE 750 MG/1
10 TABLET, FILM COATED, EXTENDED RELEASE ORAL DAILY
COMMUNITY
End: 2023-05-09 | Stop reason: ALTCHOICE

## 2023-05-09 RX ORDER — PANTOPRAZOLE SODIUM 40 MG/1
40 TABLET, DELAYED RELEASE ORAL DAILY
Qty: 30 TABLET | Refills: 11 | Status: SHIPPED | OUTPATIENT
Start: 2023-05-09 | End: 2023-06-05

## 2023-05-09 RX ORDER — SELENIUM 50 MCG
1 TABLET ORAL 2 TIMES DAILY
Qty: 60 CAPSULE | Refills: 11 | Status: SHIPPED | OUTPATIENT
Start: 2023-05-09 | End: 2024-01-02 | Stop reason: WASHOUT

## 2023-05-09 RX ORDER — SPIRONOLACTONE 25 MG/1
25 TABLET ORAL DAILY
COMMUNITY
End: 2023-07-05

## 2023-05-09 RX ORDER — PANTOPRAZOLE SODIUM 40 MG/1
40 TABLET, DELAYED RELEASE ORAL 2 TIMES DAILY
COMMUNITY
End: 2023-05-09 | Stop reason: SDUPTHER

## 2023-05-09 RX ORDER — METOPROLOL TARTRATE 25 MG/1
25 TABLET, FILM COATED ORAL 2 TIMES DAILY
Qty: 60 TABLET | Refills: 11 | Status: SHIPPED | OUTPATIENT
Start: 2023-05-09 | End: 2023-05-09 | Stop reason: SDUPTHER

## 2023-05-09 RX ORDER — METOPROLOL TARTRATE 25 MG/1
25 TABLET, FILM COATED ORAL 2 TIMES DAILY
COMMUNITY
End: 2023-05-11 | Stop reason: SDUPTHER

## 2023-05-09 ASSESSMENT — ENCOUNTER SYMPTOMS
FEVER: 0
JOINT SWELLING: 0
CHILLS: 0
SHORTNESS OF BREATH: 0
MYALGIAS: 0
NAUSEA: 0
DIAPHORESIS: 0
DIARRHEA: 0
CONSTIPATION: 0
VOMITING: 0
COUGH: 0

## 2023-05-09 ASSESSMENT — PATIENT HEALTH QUESTIONNAIRE - PHQ9
SUM OF ALL RESPONSES TO PHQ9 QUESTIONS 1 AND 2: 0
2. FEELING DOWN, DEPRESSED OR HOPELESS: NOT AT ALL
1. LITTLE INTEREST OR PLEASURE IN DOING THINGS: NOT AT ALL

## 2023-05-09 NOTE — PATIENT INSTRUCTIONS
RECOMMEND STOP POTASSIUM AND MAGNESIUM SUPPLEMENTS NOW SINCE TORSEMIDE WAS DISCONTINUED    2.  CONTINUE SPIRONOLACTONE FOR NOW    3.  PLEASE GET BLOOD TEST NEXT WEEK TO MAKE SURE POTASSIUM  AND MAGNESIUM ARE REASONABLY NORMAL    4.  FOLLOW UP 3 MONTHS AND WILL DISCUSS PROCEEDING WITH THYROID AND CHEST FOLLOW UP AT THAT TIME    5. CARDIAC REHAB REFERRAL IS MADE    6. FOLLOW UP 3 MONTHS

## 2023-05-09 NOTE — PROGRESS NOTES
"Subjective   Rupa Pinedo is a 67 y.o. female who presents for NEW PT ESTABLISH [facility]   ? PAIN IN CHEST AFTER OPEN HEART /INFECTION IN BREAST BONE.      HPI   WAS TAKEN OFF TORSEMIDE AND LISINOPRIL INCREASED, PER     LEG SWELLING NOT APPRECIABLY WORSE SINCE STOPPED TORSEMIDE    STILL TAKING SPIRONOLACTONE AND POTASSIUM    STILL HAVING WEIRD PAINS IN CHEST AFTER THE EXTENSIVE STERNAL INFECTIONS AND MULTIPLE SURGERIES FOLLOWING RECENT CABG    NEVER GOT CARDIAC REHAB DUE TO COMPLICATIONS    Ct chest from [facility] feb 2023 reviewed in detail by me      Review of Systems   Constitutional:  Negative for chills, diaphoresis and fever.   Respiratory:  Negative for cough and shortness of breath.    Cardiovascular:  Negative for chest pain and leg swelling.   Gastrointestinal:  Negative for constipation, diarrhea, nausea and vomiting.   Musculoskeletal:  Negative for joint swelling and myalgias.       Objective   /72   Pulse 71   Resp 14   Ht 1.702 m (5' 7\")   Wt 78.9 kg (174 lb)   SpO2 96%   BMI 27.25 kg/m²     Physical Exam  Vitals reviewed.   Constitutional:       General: She is not in acute distress.     Appearance: She is not ill-appearing.      Comments: FRAIL ELDERLY   Cardiovascular:      Rate and Rhythm: Normal rate and regular rhythm.      Pulses: Normal pulses.      Heart sounds:      No gallop.   Pulmonary:      Breath sounds: Normal breath sounds. No wheezing, rhonchi or rales.   Abdominal:      General: Abdomen is flat. Bowel sounds are normal.      Palpations: Abdomen is soft.      Tenderness: There is no guarding or rebound.   Musculoskeletal:      Right lower leg: No edema.      Left lower leg: No edema.      Comments: DECREASED MUSCLE MASS         Assessment/Plan   Problem List Items Addressed This Visit          Circulatory    S/P CABG (coronary artery bypass graft) - Primary    Relevant Orders    Referral to Cardiac Rehab    Paroxysmal atrial fibrillation (CMS/HCC)    " Relevant Medications    metoprolol tartrate (Lopressor) 25 mg tablet       Digestive    GERD (gastroesophageal reflux disease)    Relevant Medications    pantoprazole (ProtoNix) 40 mg EC tablet       Musculoskeletal    Sternal osteomyelitis (CMS/HCC)    Relevant Medications    lactobacillus acidophilus 500 million cell capsule       Other    Hypokalemia    Relevant Orders    Basic Metabolic Panel    Magnesium     Patient Instructions    RECOMMEND STOP POTASSIUM AND MAGNESIUM SUPPLEMENTS NOW SINCE TORSEMIDE WAS DISCONTINUED    2.  CONTINUE SPIRONOLACTONE FOR NOW    3.  PLEASE GET BLOOD TEST NEXT WEEK TO MAKE SURE POTASSIUM  AND MAGNESIUM ARE REASONABLY NORMAL    4.  FOLLOW UP 3 MONTHS AND WILL DISCUSS PROCEEDING WITH THYROID AND CHEST FOLLOW UP AT THAT TIME    5. CARDIAC REHAB REFERRAL IS MADE    6. FOLLOW UP 3 MONTHS

## 2023-05-11 ENCOUNTER — TELEPHONE (OUTPATIENT)
Dept: PRIMARY CARE | Facility: CLINIC | Age: 68
End: 2023-05-11
Payer: MEDICARE

## 2023-05-11 DIAGNOSIS — I10 ESSENTIAL HYPERTENSION: ICD-10-CM

## 2023-05-11 RX ORDER — METOPROLOL TARTRATE 25 MG/1
25 TABLET, FILM COATED ORAL DAILY
Qty: 90 TABLET | Refills: 3 | Status: SHIPPED | OUTPATIENT
Start: 2023-05-11 | End: 2024-01-02 | Stop reason: WASHOUT

## 2023-05-11 NOTE — TELEPHONE ENCOUNTER
Refill 90 day supply w/Refills  to [facility]    -Meoprolol ER 25mg 1xday  (Please send today as Pt is completely out)

## 2023-05-16 ENCOUNTER — LAB (OUTPATIENT)
Dept: LAB | Facility: LAB | Age: 68
End: 2023-05-16
Payer: MEDICARE

## 2023-05-16 DIAGNOSIS — E87.6 HYPOKALEMIA: ICD-10-CM

## 2023-05-16 LAB
ANION GAP IN SER/PLAS: 12 MMOL/L (ref 10–20)
CALCIUM (MG/DL) IN SER/PLAS: 9.5 MG/DL (ref 8.6–10.3)
CARBON DIOXIDE, TOTAL (MMOL/L) IN SER/PLAS: 29 MMOL/L (ref 21–32)
CHLORIDE (MMOL/L) IN SER/PLAS: 103 MMOL/L (ref 98–107)
CREATININE (MG/DL) IN SER/PLAS: 0.86 MG/DL (ref 0.5–1.05)
GFR FEMALE: 73 ML/MIN/1.73M2
GLUCOSE (MG/DL) IN SER/PLAS: 81 MG/DL (ref 74–99)
MAGNESIUM (MG/DL) IN SER/PLAS: 2.09 MG/DL (ref 1.6–2.4)
POTASSIUM (MMOL/L) IN SER/PLAS: 4.6 MMOL/L (ref 3.5–5.3)
SODIUM (MMOL/L) IN SER/PLAS: 139 MMOL/L (ref 136–145)
UREA NITROGEN (MG/DL) IN SER/PLAS: 13 MG/DL (ref 6–23)

## 2023-05-16 PROCEDURE — 80048 BASIC METABOLIC PNL TOTAL CA: CPT

## 2023-05-16 PROCEDURE — 83735 ASSAY OF MAGNESIUM: CPT

## 2023-05-16 PROCEDURE — 36415 COLL VENOUS BLD VENIPUNCTURE: CPT

## 2023-05-22 ENCOUNTER — PATIENT OUTREACH (OUTPATIENT)
Dept: CARE COORDINATION | Facility: CLINIC | Age: 68
End: 2023-05-22
Payer: MEDICARE

## 2023-05-22 NOTE — PROGRESS NOTES
Call placed regarding one month post discharge follow up call.  At time of outreach call the patient feels as if their condition has improved since initial visit with PCP or specialist.  Questions or concerns regarding recovery period addressed at this time. Pt denies any questions or concerns.  Reviewed any PCP or specialists progress notes/labs/radiology reports if applicable and addressed any questions or concerns.

## 2023-06-02 DIAGNOSIS — K21.9 GASTROESOPHAGEAL REFLUX DISEASE, UNSPECIFIED WHETHER ESOPHAGITIS PRESENT: ICD-10-CM

## 2023-06-05 RX ORDER — PANTOPRAZOLE SODIUM 40 MG/1
TABLET, DELAYED RELEASE ORAL
Qty: 90 TABLET | Refills: 3 | Status: SHIPPED | OUTPATIENT
Start: 2023-06-05 | End: 2024-02-12 | Stop reason: ALTCHOICE

## 2023-06-22 ENCOUNTER — PATIENT OUTREACH (OUTPATIENT)
Dept: CARE COORDINATION | Facility: CLINIC | Age: 68
End: 2023-06-22
Payer: MEDICARE

## 2023-06-22 NOTE — PROGRESS NOTES
Call placed regarding 60 day post discharge follow up call.  At time of outreach call the patient feels as if their condition has improved since initial visit with PCP or specialist.  Questions or concerns regarding recovery period addressed at this time.   Pt denies any questions or needs at this time. Pt states she is not having any issues with medications and has everything she needs at home.  Pt encouraged to call if any questions arise.

## 2023-07-04 DIAGNOSIS — I10 ESSENTIAL HYPERTENSION: ICD-10-CM

## 2023-07-05 RX ORDER — SPIRONOLACTONE 25 MG/1
TABLET ORAL
Qty: 90 TABLET | Refills: 3 | Status: SHIPPED | OUTPATIENT
Start: 2023-07-05 | End: 2024-05-06

## 2023-08-09 ENCOUNTER — LAB (OUTPATIENT)
Dept: LAB | Facility: LAB | Age: 68
End: 2023-08-09
Payer: MEDICARE

## 2023-08-09 ENCOUNTER — OFFICE VISIT (OUTPATIENT)
Dept: PRIMARY CARE | Facility: CLINIC | Age: 68
End: 2023-08-09
Payer: MEDICARE

## 2023-08-09 VITALS
HEIGHT: 67 IN | BODY MASS INDEX: 28.25 KG/M2 | RESPIRATION RATE: 14 BRPM | DIASTOLIC BLOOD PRESSURE: 64 MMHG | WEIGHT: 180 LBS | SYSTOLIC BLOOD PRESSURE: 104 MMHG | HEART RATE: 76 BPM | OXYGEN SATURATION: 98 %

## 2023-08-09 DIAGNOSIS — E04.2 MULTINODULAR GOITER: ICD-10-CM

## 2023-08-09 DIAGNOSIS — I48.0 PAROXYSMAL ATRIAL FIBRILLATION (MULTI): ICD-10-CM

## 2023-08-09 DIAGNOSIS — L30.9 DERMATITIS: ICD-10-CM

## 2023-08-09 DIAGNOSIS — Z95.1 S/P CABG (CORONARY ARTERY BYPASS GRAFT): ICD-10-CM

## 2023-08-09 DIAGNOSIS — I49.1 PREMATURE ATRIAL CONTRACTIONS: ICD-10-CM

## 2023-08-09 DIAGNOSIS — I49.1 PAC (PREMATURE ATRIAL CONTRACTION): Primary | ICD-10-CM

## 2023-08-09 DIAGNOSIS — E04.2 MULTINODULAR GOITER (NONTOXIC): ICD-10-CM

## 2023-08-09 LAB
ALANINE AMINOTRANSFERASE (SGPT) (U/L) IN SER/PLAS: 18 U/L (ref 7–45)
ALBUMIN (G/DL) IN SER/PLAS: 4.4 G/DL (ref 3.4–5)
ALKALINE PHOSPHATASE (U/L) IN SER/PLAS: 72 U/L (ref 33–136)
ANION GAP IN SER/PLAS: 17 MMOL/L (ref 10–20)
ASPARTATE AMINOTRANSFERASE (SGOT) (U/L) IN SER/PLAS: 20 U/L (ref 9–39)
BILIRUBIN TOTAL (MG/DL) IN SER/PLAS: 0.6 MG/DL (ref 0–1.2)
CALCIUM (MG/DL) IN SER/PLAS: 9.7 MG/DL (ref 8.6–10.3)
CARBON DIOXIDE, TOTAL (MMOL/L) IN SER/PLAS: 24 MMOL/L (ref 21–32)
CHLORIDE (MMOL/L) IN SER/PLAS: 105 MMOL/L (ref 98–107)
CREATININE (MG/DL) IN SER/PLAS: 0.82 MG/DL (ref 0.5–1.05)
ERYTHROCYTE DISTRIBUTION WIDTH (RATIO) BY AUTOMATED COUNT: 15.8 % (ref 11.5–14.5)
ERYTHROCYTE MEAN CORPUSCULAR HEMOGLOBIN CONCENTRATION (G/DL) BY AUTOMATED: 31.6 G/DL (ref 32–36)
ERYTHROCYTE MEAN CORPUSCULAR VOLUME (FL) BY AUTOMATED COUNT: 89 FL (ref 80–100)
ERYTHROCYTES (10*6/UL) IN BLOOD BY AUTOMATED COUNT: 5.38 X10E12/L (ref 4–5.2)
GFR FEMALE: 77 ML/MIN/1.73M2
GLUCOSE (MG/DL) IN SER/PLAS: 82 MG/DL (ref 74–99)
HEMATOCRIT (%) IN BLOOD BY AUTOMATED COUNT: 48.1 % (ref 36–46)
HEMOGLOBIN (G/DL) IN BLOOD: 15.2 G/DL (ref 12–16)
LEUKOCYTES (10*3/UL) IN BLOOD BY AUTOMATED COUNT: 6.1 X10E9/L (ref 4.4–11.3)
MAGNESIUM (MG/DL) IN SER/PLAS: 2.06 MG/DL (ref 1.6–2.4)
PLATELETS (10*3/UL) IN BLOOD AUTOMATED COUNT: 226 X10E9/L (ref 150–450)
POTASSIUM (MMOL/L) IN SER/PLAS: 4.2 MMOL/L (ref 3.5–5.3)
PROTEIN TOTAL: 7.7 G/DL (ref 6.4–8.2)
SODIUM (MMOL/L) IN SER/PLAS: 142 MMOL/L (ref 136–145)
THYROTROPIN (MIU/L) IN SER/PLAS BY DETECTION LIMIT <= 0.05 MIU/L: 1.25 MIU/L (ref 0.44–3.98)
UREA NITROGEN (MG/DL) IN SER/PLAS: 24 MG/DL (ref 6–23)

## 2023-08-09 PROCEDURE — 3074F SYST BP LT 130 MM HG: CPT | Performed by: INTERNAL MEDICINE

## 2023-08-09 PROCEDURE — 3008F BODY MASS INDEX DOCD: CPT | Performed by: INTERNAL MEDICINE

## 2023-08-09 PROCEDURE — 83735 ASSAY OF MAGNESIUM: CPT

## 2023-08-09 PROCEDURE — 84443 ASSAY THYROID STIM HORMONE: CPT

## 2023-08-09 PROCEDURE — 93000 ELECTROCARDIOGRAM COMPLETE: CPT | Performed by: INTERNAL MEDICINE

## 2023-08-09 PROCEDURE — 85027 COMPLETE CBC AUTOMATED: CPT

## 2023-08-09 PROCEDURE — 1159F MED LIST DOCD IN RCRD: CPT | Performed by: INTERNAL MEDICINE

## 2023-08-09 PROCEDURE — 80053 COMPREHEN METABOLIC PANEL: CPT

## 2023-08-09 PROCEDURE — 36415 COLL VENOUS BLD VENIPUNCTURE: CPT

## 2023-08-09 PROCEDURE — 3078F DIAST BP <80 MM HG: CPT | Performed by: INTERNAL MEDICINE

## 2023-08-09 PROCEDURE — 99214 OFFICE O/P EST MOD 30 MIN: CPT | Performed by: INTERNAL MEDICINE

## 2023-08-09 RX ORDER — TRIAMCINOLONE ACETONIDE 1 MG/G
CREAM TOPICAL 2 TIMES DAILY
Qty: 30 G | Refills: 2 | Status: SHIPPED | OUTPATIENT
Start: 2023-08-09 | End: 2024-01-02 | Stop reason: WASHOUT

## 2023-08-09 ASSESSMENT — ENCOUNTER SYMPTOMS
DIAPHORESIS: 0
FEVER: 0
VOMITING: 0
DIARRHEA: 0
JOINT SWELLING: 0
NAUSEA: 0
SHORTNESS OF BREATH: 0
MYALGIAS: 0
CONSTIPATION: 0
CHILLS: 0
COUGH: 0

## 2023-08-09 NOTE — PATIENT INSTRUCTIONS
THE PATCH ON THE LEFT LOWER LEG APPEARS TO BE A DERMATITIS, POSSIBLY A REACTION TO NEOSPORIN.  A MEDIUM POTENCY TOPICAL STEROID CREAM - TRIAMCINOLONE - TWICE DAILY AND AVOIDANCE OF NEOSPORIN SHOULD HELP THE ITCH AND RASH TO GO AWAY    2.  OK FOR YOU TO STOP GABAPENTIN AT THIS TIME AND CALL ME IF ANY NERVE PAINS RETURN OF CONSEQUENCE, AND I COULD THEN REFILL GABAPENTIN FOR YOU    3.  THE EKG SHOWS NORMAL SINUS RHYTHM WITH PREMATURE ATRIAL CONTRACTIONS THAT CAN BE A NORMAL SITUATION FOLLOWING CARDIAC SURGERY, EVEN FOR MONTHS AFTER    4.  TO BE EXTRA CAREFUL, WILL CHECK A 72 HOUR HEART MONITOR TO MAKE SURE YOU AREN'T SLIPPING IN AND OUT OF AFIB./  WILL ALSO CHECK ELECTROLYTES TO MAKE SURE THIS IS NOT PROPOGATING THE EXTRA HEARTBEATS.  IF LABS AND HOLTER ARE GOOD, THEN WE WILL LET YOU GO ON YOUR WAY STAY ON THE SAME MEDS AND SEE JEROME LEMOS IN JANUARY AS PLANNED.    5.  FOLLOW UP HERE IN 6 MONTHS OR AS NEEDED.    6.  NON-FASTING LABS TODAY

## 2023-08-09 NOTE — PROGRESS NOTES
"Subjective   Rupa Pinedo is a 68 y.o. female who presents for FOLLOW UP   SPOT ON LEFT LOWER LEG STARTED 2 RED DOTS IN APRIL ITCHY NOW IS A LOT BIGGER AND WEEPS     HPI   WALKING WITH NEIGHBOR GETTING SOME TOLERANCE    SOMETIMES IF PACE GETS TOO QUICK CAN GET A LITTLE SOB, BUT NO CHEST PAIN    EVERY NOW AND THEN WILL GET A MINI-PRESSURE POSSIBLY LIKE ITS IN AFIB    DR. MIXON SEES.  PICKED DR. CANO SEE IN JANUARY    HAVING ON MAJOR NERVE PAINS      Review of Systems   Constitutional:  Negative for chills, diaphoresis and fever.   Respiratory:  Negative for cough and shortness of breath.    Cardiovascular:  Negative for chest pain and leg swelling.   Gastrointestinal:  Negative for constipation, diarrhea, nausea and vomiting.   Musculoskeletal:  Negative for joint swelling and myalgias.       Objective   /64   Pulse 76   Resp 14   Ht 1.702 m (5' 7\")   Wt 81.6 kg (180 lb)   SpO2 98%   BMI 28.19 kg/m²     Physical Exam  Vitals reviewed.   Constitutional:       General: She is not in acute distress.     Appearance: She is not ill-appearing.   Cardiovascular:      Rate and Rhythm: Normal rate. Rhythm irregular.      Pulses: Normal pulses.      Heart sounds: No murmur heard.     No friction rub. No gallop.   Pulmonary:      Breath sounds: Normal breath sounds. No wheezing, rhonchi or rales.   Abdominal:      General: Abdomen is flat. Bowel sounds are normal.      Palpations: Abdomen is soft.      Tenderness: There is no guarding or rebound.   Musculoskeletal:      Right lower leg: No edema.      Left lower leg: No edema.   Skin:     Findings: Rash (DERMATITIS PATCH WITH EXCORIATION LEFT MEDICAL LOWER LEG JUST PROXIMAL TO ANKLE) present.         Assessment/Plan   Problem List Items Addressed This Visit       S/P CABG (coronary artery bypass graft)    Relevant Orders    Comprehensive Metabolic Panel    CBC    Magnesium    Paroxysmal atrial fibrillation (CMS/HCC)    Relevant Orders    Holter or Event Cardiac " Monitor    PAC (premature atrial contraction) - Primary     Other Visit Diagnoses       Dermatitis        Relevant Medications    triamcinolone (Kenalog) 0.1 % cream    Premature atrial contractions        Relevant Orders    Comprehensive Metabolic Panel    CBC    Magnesium    ECG 12 lead (Clinic Performed)    Multinodular goiter (nontoxic)        Relevant Orders    US thyroid    Multinodular goiter        Relevant Orders    US thyroid    TSH with reflex to Free T4 if abnormal          Patient Instructions    THE PATCH ON THE LEFT LOWER LEG APPEARS TO BE A DERMATITIS, POSSIBLY A REACTION TO NEOSPORIN.  A MEDIUM POTENCY TOPICAL STEROID CREAM - TRIAMCINOLONE - TWICE DAILY AND AVOIDANCE OF NEOSPORIN SHOULD HELP THE ITCH AND RASH TO GO AWAY    2.  OK FOR YOU TO STOP GABAPENTIN AT THIS TIME AND CALL ME IF ANY NERVE PAINS RETURN OF CONSEQUENCE, AND I COULD THEN REFILL GABAPENTIN FOR YOU    3.  THE EKG SHOWS NORMAL SINUS RHYTHM WITH PREMATURE ATRIAL CONTRACTIONS THAT CAN BE A NORMAL SITUATION FOLLOWING CARDIAC SURGERY, EVEN FOR MONTHS AFTER    4.  TO BE EXTRA CAREFUL, WILL CHECK A 72 HOUR HEART MONITOR TO MAKE SURE YOU AREN'T SLIPPING IN AND OUT OF AFIB./  WILL ALSO CHECK ELECTROLYTES TO MAKE SURE THIS IS NOT PROPOGATING THE EXTRA HEARTBEATS.  IF LABS AND HOLTER ARE GOOD, THEN WE WILL LET YOU GO ON YOUR WAY STAY ON THE SAME MEDS AND SEE JEROME LEMOS IN JANUARY AS PLANNED.    5.  FOLLOW UP HERE IN 6 MONTHS OR AS NEEDED.    6.  NON-FASTING LABS TODAY

## 2023-08-28 ENCOUNTER — OFFICE VISIT (OUTPATIENT)
Dept: PRIMARY CARE | Facility: CLINIC | Age: 68
End: 2023-08-28
Payer: MEDICARE

## 2023-08-28 VITALS
BODY MASS INDEX: 28.51 KG/M2 | TEMPERATURE: 97.4 F | RESPIRATION RATE: 20 BRPM | HEART RATE: 68 BPM | SYSTOLIC BLOOD PRESSURE: 138 MMHG | OXYGEN SATURATION: 95 % | WEIGHT: 182 LBS | DIASTOLIC BLOOD PRESSURE: 82 MMHG

## 2023-08-28 DIAGNOSIS — J06.9 ACUTE URI: Primary | ICD-10-CM

## 2023-08-28 PROCEDURE — 99213 OFFICE O/P EST LOW 20 MIN: CPT | Performed by: NURSE PRACTITIONER

## 2023-08-28 PROCEDURE — 3008F BODY MASS INDEX DOCD: CPT | Performed by: NURSE PRACTITIONER

## 2023-08-28 PROCEDURE — 3075F SYST BP GE 130 - 139MM HG: CPT | Performed by: NURSE PRACTITIONER

## 2023-08-28 PROCEDURE — 1159F MED LIST DOCD IN RCRD: CPT | Performed by: NURSE PRACTITIONER

## 2023-08-28 PROCEDURE — 3079F DIAST BP 80-89 MM HG: CPT | Performed by: NURSE PRACTITIONER

## 2023-08-28 RX ORDER — DOXYCYCLINE 100 MG/1
100 CAPSULE ORAL 2 TIMES DAILY
Qty: 14 CAPSULE | Refills: 0 | Status: SHIPPED | OUTPATIENT
Start: 2023-08-28 | End: 2023-09-04

## 2023-08-28 RX ORDER — METHYLPREDNISOLONE 4 MG/1
TABLET ORAL
Qty: 21 TABLET | Refills: 0 | Status: SHIPPED | OUTPATIENT
Start: 2023-08-28 | End: 2023-09-04

## 2023-08-28 ASSESSMENT — ENCOUNTER SYMPTOMS
COUGH: 1
SINUS PAIN: 1
HEADACHES: 1
FEVER: 0
NAUSEA: 0
SORE THROAT: 0
APPETITE CHANGE: 0
DIARRHEA: 0
ACTIVITY CHANGE: 0
VOMITING: 0
SLEEP DISTURBANCE: 1
SINUS PRESSURE: 0
FATIGUE: 0
WHEEZING: 1
CHILLS: 0
CONSTIPATION: 0

## 2023-08-28 NOTE — ASSESSMENT & PLAN NOTE
Order for chest xray placed; Will follow up on results  Antibiotic given for acute URI, possible pneumonia; Take full course until completed  Taper steroid for help with cough and SOB  Follow up with PCP if not improving  ER for any fevers, difficulty breathing or worsening SOB or symptoms

## 2023-08-28 NOTE — PROGRESS NOTES
Subjective   Patient ID: Rupa Pinedo is a 68 y.o. female who presents for URI.    Cough since Aug18  SOB with exertion  Wheezing  Sore throat and headaches from coughing  No Ear pain  No Sinus pain/pressure  No fever or chills  No GI issues    OTC- Mucinex    URI   This is a new problem. The current episode started 1 to 4 weeks ago (10 days). The problem has been unchanged. There has been no fever. Associated symptoms include congestion, coughing, headaches, sinus pain and wheezing. Pertinent negatives include no diarrhea, ear pain, nausea, sore throat or vomiting. Associated symptoms comments: Post nasal drainage  . Treatments tried: Mucinex. The treatment provided mild relief.        Review of Systems   Constitutional:  Negative for activity change, appetite change, chills, fatigue and fever.   HENT:  Positive for congestion, postnasal drip and sinus pain. Negative for ear pain, sinus pressure and sore throat.    Respiratory:  Positive for cough and wheezing.    Gastrointestinal:  Negative for constipation, diarrhea, nausea and vomiting.   Neurological:  Positive for headaches.   Psychiatric/Behavioral:  Positive for sleep disturbance.        Objective   /82   Pulse 68   Temp 36.3 °C (97.4 °F) (Temporal)   Resp 20   Wt 82.6 kg (182 lb)   SpO2 95%   BMI 28.51 kg/m²     Physical Exam  Vitals reviewed.   Constitutional:       Appearance: Normal appearance.   HENT:      Head: Normocephalic.      Right Ear: Tympanic membrane, ear canal and external ear normal.      Left Ear: Tympanic membrane, ear canal and external ear normal.      Nose: Mucosal edema and congestion present.      Mouth/Throat:      Lips: Pink.      Mouth: Mucous membranes are moist.   Eyes:      Extraocular Movements: Extraocular movements intact.      Pupils: Pupils are equal, round, and reactive to light.   Cardiovascular:      Rate and Rhythm: Normal rate and regular rhythm.      Pulses: Normal pulses.      Heart sounds: Normal heart  sounds.   Pulmonary:      Effort: Pulmonary effort is normal.      Breath sounds: Examination of the right-upper field reveals wheezing. Examination of the left-upper field reveals wheezing. Examination of the right-lower field reveals wheezing. Examination of the left-lower field reveals wheezing. Wheezing present.   Musculoskeletal:         General: Normal range of motion.      Cervical back: Normal range of motion and neck supple.   Skin:     General: Skin is warm and dry.      Capillary Refill: Capillary refill takes less than 2 seconds.   Neurological:      General: No focal deficit present.      Mental Status: She is alert and oriented to person, place, and time.   Psychiatric:         Mood and Affect: Mood normal.         Behavior: Behavior normal. Behavior is cooperative.         Assessment/Plan   Problem List Items Addressed This Visit       Acute URI - Primary     Order for chest xray placed; Will follow up on results  Antibiotic given for acute URI, possible pneumonia; Take full course until completed  Taper steroid for help with cough and SOB  Follow up with PCP if not improving  ER for any fevers, difficulty breathing or worsening SOB or symptoms           Relevant Medications    doxycycline (Vibramycin) 100 mg capsule    methylPREDNISolone (Medrol Dospak) 4 mg tablets    Other Relevant Orders    XR chest 2 views

## 2023-09-01 ENCOUNTER — TELEPHONE (OUTPATIENT)
Dept: PRIMARY CARE | Facility: CLINIC | Age: 68
End: 2023-09-01
Payer: MEDICARE

## 2023-09-05 ENCOUNTER — TELEPHONE (OUTPATIENT)
Dept: PRIMARY CARE | Facility: CLINIC | Age: 68
End: 2023-09-05

## 2023-10-16 ENCOUNTER — TELEPHONE (OUTPATIENT)
Dept: PRIMARY CARE | Facility: CLINIC | Age: 68
End: 2023-10-16
Payer: MEDICARE

## 2023-10-16 NOTE — TELEPHONE ENCOUNTER
BEN BOYD,  MS. LEBLANC WAS APPARENTLY ASKING YENNIFER FOR HER FINAL HOLTER MONITOR REPORT.  CAN YOU CALL AND SEE IF IT IS READ YET?  FROM EARLY AUGUST?  THX  ANASTACIA

## 2023-11-29 PROBLEM — K26.9 MULTIPLE DUODENAL ULCERS: Status: ACTIVE | Noted: 2023-11-29

## 2023-11-29 PROBLEM — R91.8 LUNG NODULES: Status: ACTIVE | Noted: 2023-11-29

## 2023-11-29 PROBLEM — R91.8 MULTIPLE PULMONARY NODULES: Status: ACTIVE | Noted: 2023-11-29

## 2023-11-29 PROBLEM — E04.2 NON-TOXIC MULTINODULAR GOITER: Status: ACTIVE | Noted: 2023-08-09

## 2023-11-29 PROBLEM — E66.9 OBESITY: Status: ACTIVE | Noted: 2022-10-11

## 2023-11-29 PROBLEM — Z85.3 PERSONAL HISTORY OF MALIGNANT NEOPLASM OF BREAST: Status: ACTIVE | Noted: 2022-10-11

## 2023-11-29 PROBLEM — E78.5 HYPERLIPIDEMIA: Status: ACTIVE | Noted: 2023-02-23

## 2023-11-29 PROBLEM — E83.42 HYPOMAGNESEMIA: Status: ACTIVE | Noted: 2023-11-29

## 2023-11-29 PROBLEM — T14.8XXA LOCAL INFECTION OF WOUND: Status: ACTIVE | Noted: 2022-12-27

## 2023-11-29 PROBLEM — R53.1 WEAKNESS: Status: ACTIVE | Noted: 2023-11-29

## 2023-11-29 PROBLEM — T14.8XXA SKIN ABRASION: Status: ACTIVE | Noted: 2023-11-29

## 2023-11-29 PROBLEM — L08.9 LOCAL INFECTION OF WOUND: Status: ACTIVE | Noted: 2022-12-27

## 2023-11-29 PROBLEM — G89.18 POST-OP PAIN: Status: ACTIVE | Noted: 2022-12-30

## 2023-11-29 PROBLEM — M62.838: Status: ACTIVE | Noted: 2023-11-29

## 2023-11-29 PROBLEM — R11.2 NAUSEA AND/OR VOMITING: Status: ACTIVE | Noted: 2023-11-29

## 2023-11-29 PROBLEM — K26.9 DUODENAL ULCER: Status: ACTIVE | Noted: 2023-11-29

## 2023-11-29 PROBLEM — R31.9 HEMATURIA: Status: ACTIVE | Noted: 2023-11-29

## 2023-11-29 PROBLEM — F17.200 NICOTINE USE DISORDER: Status: ACTIVE | Noted: 2022-12-31

## 2023-11-29 PROBLEM — E78.2 MIXED HYPERLIPIDEMIA: Status: ACTIVE | Noted: 2023-08-03

## 2023-11-29 PROBLEM — R53.81 PHYSICAL DEBILITY: Status: ACTIVE | Noted: 2023-11-29

## 2023-11-29 RX ORDER — TORSEMIDE 10 MG/1
1 TABLET ORAL DAILY
COMMUNITY
End: 2024-01-02 | Stop reason: ALTCHOICE

## 2023-11-29 RX ORDER — NITROGLYCERIN 0.4 MG/1
TABLET SUBLINGUAL
COMMUNITY
Start: 2022-10-12

## 2023-11-29 RX ORDER — POLYETHYLENE GLYCOL 3350 17 G/17G
POWDER, FOR SOLUTION ORAL
COMMUNITY
Start: 2023-01-06 | End: 2024-01-02 | Stop reason: ALTCHOICE

## 2023-11-29 RX ORDER — ONDANSETRON 4 MG/1
TABLET, FILM COATED ORAL EVERY 6 HOURS PRN
COMMUNITY
End: 2024-01-02 | Stop reason: ALTCHOICE

## 2023-11-29 RX ORDER — ADHESIVE BANDAGE
30 BANDAGE TOPICAL DAILY PRN
COMMUNITY
Start: 2022-10-12 | End: 2024-01-02 | Stop reason: ALTCHOICE

## 2023-11-29 RX ORDER — METHOCARBAMOL 500 MG/1
500 TABLET, FILM COATED ORAL
COMMUNITY
End: 2024-01-02 | Stop reason: ALTCHOICE

## 2023-11-29 RX ORDER — AMIODARONE HYDROCHLORIDE 200 MG/1
200 TABLET ORAL DAILY
COMMUNITY
Start: 2022-10-27 | End: 2024-01-02 | Stop reason: ALTCHOICE

## 2024-01-02 ENCOUNTER — OFFICE VISIT (OUTPATIENT)
Dept: CARDIOLOGY | Facility: CLINIC | Age: 69
End: 2024-01-02
Payer: MEDICARE

## 2024-01-02 VITALS
DIASTOLIC BLOOD PRESSURE: 78 MMHG | WEIGHT: 190 LBS | BODY MASS INDEX: 31.65 KG/M2 | SYSTOLIC BLOOD PRESSURE: 130 MMHG | HEIGHT: 65 IN | HEART RATE: 73 BPM | OXYGEN SATURATION: 96 %

## 2024-01-02 DIAGNOSIS — I10 ESSENTIAL HYPERTENSION: ICD-10-CM

## 2024-01-02 DIAGNOSIS — E78.5 HYPERLIPIDEMIA, UNSPECIFIED HYPERLIPIDEMIA TYPE: ICD-10-CM

## 2024-01-02 DIAGNOSIS — I48.0 PAROXYSMAL ATRIAL FIBRILLATION (MULTI): ICD-10-CM

## 2024-01-02 DIAGNOSIS — I25.10 CORONARY ARTERY DISEASE INVOLVING NATIVE CORONARY ARTERY OF NATIVE HEART WITHOUT ANGINA PECTORIS: Primary | ICD-10-CM

## 2024-01-02 PROBLEM — E78.2 MIXED HYPERLIPIDEMIA: Status: RESOLVED | Noted: 2023-08-03 | Resolved: 2024-01-02

## 2024-01-02 PROCEDURE — 1160F RVW MEDS BY RX/DR IN RCRD: CPT | Performed by: INTERNAL MEDICINE

## 2024-01-02 PROCEDURE — 3075F SYST BP GE 130 - 139MM HG: CPT | Performed by: INTERNAL MEDICINE

## 2024-01-02 PROCEDURE — 1126F AMNT PAIN NOTED NONE PRSNT: CPT | Performed by: INTERNAL MEDICINE

## 2024-01-02 PROCEDURE — 3008F BODY MASS INDEX DOCD: CPT | Performed by: INTERNAL MEDICINE

## 2024-01-02 PROCEDURE — 1159F MED LIST DOCD IN RCRD: CPT | Performed by: INTERNAL MEDICINE

## 2024-01-02 PROCEDURE — 99214 OFFICE O/P EST MOD 30 MIN: CPT | Performed by: INTERNAL MEDICINE

## 2024-01-02 PROCEDURE — 93000 ELECTROCARDIOGRAM COMPLETE: CPT | Performed by: INTERNAL MEDICINE

## 2024-01-02 PROCEDURE — 3078F DIAST BP <80 MM HG: CPT | Performed by: INTERNAL MEDICINE

## 2024-01-02 RX ORDER — METOPROLOL SUCCINATE 25 MG/1
25 TABLET, EXTENDED RELEASE ORAL DAILY
Qty: 90 TABLET | Refills: 3 | Status: SHIPPED | OUTPATIENT
Start: 2024-01-02 | End: 2025-01-01

## 2024-01-02 ASSESSMENT — PAIN SCALES - GENERAL: PAINLEVEL: 0-NO PAIN

## 2024-01-02 ASSESSMENT — ENCOUNTER SYMPTOMS
DEPRESSION: 0
OCCASIONAL FEELINGS OF UNSTEADINESS: 0
LOSS OF SENSATION IN FEET: 0

## 2024-01-02 NOTE — PROGRESS NOTES
Complaint:   No chief complaint on file.     History Of Present Illness:    Rupa Pinedo is a 68 y.o. 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    Has been doing well from a cardiovascular standpoint.  Occasionally has palpitations which seem to be triggered by stress or anxiety.  The only lasted a very short time.  No associated lightheadedness or loss of consciousness.    Denies any chest pain or exertional shortness of breath.    She used to work at [employer] as a .    Ambulatory monitor August 2023: 48-hour monitor.  Predominantly sinus rhythm.  Nonsustained episodes of supraventricular tachycardia.  Longest was 12 beats and fastest was 3 beats at a rate of 131 bpm.  No evidence of atrial fibrillation.    Limited echocardiogram 10/10/2022: Normal LV size and function.    3V CABG 10/5/2022: LIMA to diagonal, ANTONIA to LAD and VG to ramus.     Past Medical History:  She has a past medical history of Breast cancer (CMS/Formerly [facility]).    Past Surgical History:  She has a past surgical history that includes CT angio abdomen pelvis w and or wo IV IV contrast (2/1/2023).      Social History:  She reports that she has been smoking cigarettes. She has never used smokeless tobacco. She reports that she does not currently use alcohol. She reports that she does not currently use drugs.    Family History:  Family History   Problem Relation Name Age of Onset    Diabetes Mother      Prostate cancer Father      Diabetes Father      Diabetes Sister      Kidney cancer Brother      Diabetes Brother          Allergies:  Augmentin [amoxicillin-pot clavulanate], Blueberry, Flavoring agent, Metronidazole, Cephalexin, Ciprofloxacin, Clarithromycin, Clindamycin, Irbesartan-hydrochlorothiazide, Moxifloxacin, Sulfa (sulfonamide  "antibiotics), and Vancomycin    Outpatient Medications:  Current Outpatient Medications   Medication Instructions    aspirin 81 mg EC tablet oral    atorvastatin (LIPITOR) 80 mg, oral, Daily RT    lisinopril 10 mg tablet 1 tablet, oral, Daily    metoprolol succinate XL (TOPROL-XL) 25 mg, oral, Daily, Do not crush or chew.    nitroglycerin (Nitrostat) 0.4 mg SL tablet sublingual    pantoprazole (ProtoNix) 40 mg EC tablet TAKE 1 TABLET (40 MG) BY MOUTH DAILY DO NOT CRUSH, CHEW, ORSPLIT    spironolactone (Aldactone) 25 mg tablet TAKE 1 TABLET EVERY DAY       Last Recorded Vitals:  Visit Vitals  /78 (BP Location: Left arm, Patient Position: Sitting)   Pulse 73   Ht 1.651 m (5' 5\")   Wt 86.2 kg (190 lb)   SpO2 96%   BMI 31.62 kg/m²   Smoking Status Every Day   BSA 1.99 m²      LASTWT(3):   Wt Readings from Last 3 Encounters:   01/02/24 86.2 kg (190 lb)   08/28/23 82.6 kg (182 lb)   08/09/23 81.6 kg (180 lb)       Physical Exam:  In general: alert and in no acute distress.   HEENT: Carotid upstrokes normal with no bruits. JVP is normal.   Pulmonary: Clear to auscultation bilaterally.  Cardiovascular: S1,S2, regular. No appreciable murmurs, rubs or gallops.   Lower extremities: Warm. 2+ distal pulses. No edema.     Last Labs:  CBC -  Recent Labs     08/09/23  1116 02/23/23  0539 02/22/23  0944   WBC 6.1 4.6 5.6   HGB 15.2 8.7* 8.7*   HCT 48.1* 29.4* 28.1*    239 231   MCV 89 93 91       CMP -  Recent Labs     08/09/23  1116 05/16/23  0902 04/19/23  1041 04/07/23  0942 02/23/23  0539    139 136   < > 143   K 4.2 4.6 3.7   < > 3.8    103 100   < > 106   CO2 24 29 28   < > 32   ANIONGAP 17 12 12   < > 9*   BUN 24* 13 18   < > 9   CREATININE 0.82 0.86 0.82   < > 0.52   MG 2.06 2.09  --   --  2.13    < > = values in this interval not displayed.     Recent Labs     08/09/23  1116 02/22/23  0944 02/21/23  2255 02/06/23  0756   ALBUMIN 4.4 2.6* 2.8* 2.3*   ALKPHOS 72  --  55 87   ALT 18  --  24 88*   AST " 20  --  41* 185*   BILITOT 0.6  --  0.6 0.7       LIPID PANEL -   Recent Labs     05/26/20  0711   CHOL 153   LDLF 95   HDL 45.0   TRIG 63       Recent Labs     09/19/22  0838 05/26/20  0711   HGBA1C 5.4 5.5           Assessment/Plan   1) CAD: 3V CABG October 2022 at [facility].  Complicated by sternal wound infection requiring redo surgery.  Has recovered from that.  Will continue to observe.    2) dyslipidemia: LDL in Oct 2022 was 25.  Continue statin therapy.  Repeat lipids at next routine blood work.    3) paroxysmal atrial fibrillation: No evidence of recurrence by ambulatory monitoring.  Occasional palpitations which are short-lived.  Would continue to observe.    4) hypertension: Blood pressure well-controlled.    5) rash: will stop pantoprazole and follow-up with Dr Montero in 1 month     6) follow-up: 6 months or sooner if needed      Julio Monique MD

## 2024-02-12 ENCOUNTER — OFFICE VISIT (OUTPATIENT)
Dept: PRIMARY CARE | Facility: CLINIC | Age: 69
End: 2024-02-12
Payer: MEDICARE

## 2024-02-12 VITALS
BODY MASS INDEX: 33.15 KG/M2 | SYSTOLIC BLOOD PRESSURE: 110 MMHG | HEIGHT: 65 IN | WEIGHT: 199 LBS | DIASTOLIC BLOOD PRESSURE: 68 MMHG | HEART RATE: 68 BPM | OXYGEN SATURATION: 98 % | RESPIRATION RATE: 14 BRPM

## 2024-02-12 DIAGNOSIS — R91.1 LUNG NODULE: ICD-10-CM

## 2024-02-12 DIAGNOSIS — E55.9 MILD VITAMIN D DEFICIENCY: ICD-10-CM

## 2024-02-12 DIAGNOSIS — J44.9 CHRONIC OBSTRUCTIVE PULMONARY DISEASE, UNSPECIFIED COPD TYPE (MULTI): ICD-10-CM

## 2024-02-12 DIAGNOSIS — I11.0 HYPERTENSIVE HEART DISEASE WITH HEART FAILURE (MULTI): ICD-10-CM

## 2024-02-12 DIAGNOSIS — E78.5 HYPERLIPIDEMIA, UNSPECIFIED HYPERLIPIDEMIA TYPE: ICD-10-CM

## 2024-02-12 DIAGNOSIS — R31.0 GROSS HEMATURIA: ICD-10-CM

## 2024-02-12 DIAGNOSIS — R21 RASH: Primary | ICD-10-CM

## 2024-02-12 PROBLEM — M86.9 STERNAL OSTEOMYELITIS (MULTI): Status: RESOLVED | Noted: 2023-03-09 | Resolved: 2024-02-12

## 2024-02-12 PROBLEM — I50.31 ACUTE DIASTOLIC (CONGESTIVE) HEART FAILURE (MULTI): Status: RESOLVED | Noted: 2022-10-11 | Resolved: 2024-02-12

## 2024-02-12 PROCEDURE — 99214 OFFICE O/P EST MOD 30 MIN: CPT | Performed by: INTERNAL MEDICINE

## 2024-02-12 PROCEDURE — 3074F SYST BP LT 130 MM HG: CPT | Performed by: INTERNAL MEDICINE

## 2024-02-12 PROCEDURE — 1160F RVW MEDS BY RX/DR IN RCRD: CPT | Performed by: INTERNAL MEDICINE

## 2024-02-12 PROCEDURE — 1159F MED LIST DOCD IN RCRD: CPT | Performed by: INTERNAL MEDICINE

## 2024-02-12 PROCEDURE — 3078F DIAST BP <80 MM HG: CPT | Performed by: INTERNAL MEDICINE

## 2024-02-12 PROCEDURE — 1126F AMNT PAIN NOTED NONE PRSNT: CPT | Performed by: INTERNAL MEDICINE

## 2024-02-12 RX ORDER — TRIAMCINOLONE ACETONIDE 0.25 MG/G
1 CREAM TOPICAL 2 TIMES DAILY
COMMUNITY

## 2024-02-12 ASSESSMENT — ENCOUNTER SYMPTOMS
COUGH: 0
VOMITING: 0
MYALGIAS: 0
CONSTIPATION: 0
CHILLS: 0
FEVER: 0
NAUSEA: 0
DIARRHEA: 0
JOINT SWELLING: 0
SHORTNESS OF BREATH: 0
DIAPHORESIS: 0
HEMATURIA: 1

## 2024-02-12 ASSESSMENT — PATIENT HEALTH QUESTIONNAIRE - PHQ9
2. FEELING DOWN, DEPRESSED OR HOPELESS: NOT AT ALL
SUM OF ALL RESPONSES TO PHQ9 QUESTIONS 1 AND 2: 0
1. LITTLE INTEREST OR PLEASURE IN DOING THINGS: NOT AT ALL

## 2024-02-12 NOTE — PATIENT INSTRUCTIONS
FASTING LABS ARE ORDERED FOR YOU    2.  REFERRAL TO DERM FOR NON-RESOLVING RASH    3.  OK FOR YOU TO TAKE A CAR TRIP AS LONG AS YOU TAKE REGULAR STOPS TO GET OUT AND WALK    4.  THE KIND OF ANKLE SWELLING YOU HAVE IS NOT FELT TO BE EVIDENCE OF UNCOMPENSATED CONGESTIVE HEART FAILURE, BUT RATHER THE NORMAL VENOUS INSUFFICIENCY SWELLING THAT MANY PEOPLE EXPERIENCE AFTER PROLONGED CRITICAL ILLNESS.  THEREFORE, DIURETICS ARE NOT FELT TO BE INDICATED AT THIS PARTICULAR TIME    5.  HAIR FRACTURE CAUSING HAIR LOSS VERY COMMONLY RESOLVES AFTER ENERGY STORES ARE REPLACED FOLLOWING A PROLONGED CRITICAL ILLNESS, AS HAIR HAS RETURNED IN YOUR CASE    6.  OK TO GET THIRD AND POSSIBLY FINAL COVID-19 IMMUNIZATION NOW.    7.  PROCEED WITH DIET/EXERCISE/WEIGHT MANAGEMENT    8.  FOLLOW UP 6 MONTHS OR AS NEEDED.    9.  LUNG CT TO MONITOR SMALL LUNG NODULE    10.  STOP SMOKING RECOMMENDED    11.  ULTRASOUND KIDNEY ORDERED TO COMPLETE WORKUP FOR HISTORICAL GROSS HEMATURIA.  OF NOTE RENAL FUNCTION IS NORMAL.

## 2024-02-12 NOTE — PROGRESS NOTES
"Subjective   Rupa Pinedo is a 68 y.o. female who presents for FOLLOW UP     HPI   For 2 days was having svt, HER HEART DOCTOR TOLD HER THAT IT CAN HAPPEN, DIDN'T PRESCRIBE ANYTHING    RETAINING WATER AGAIN, SEE SOCKLINE AND MILD EDEMA AT END OF DAY    RASH BECAME MORE SEVERE AFTER LAST VISIT, PROGRESSED DESPITE THE CREAMS.  STOPPED PANTOPRAZOLE, DIDN'T ALLEVIATE THE RASHES, ON BOTH LEGS AND BACKS OF ARMS NOW.  OFF PANTOPRAZOLE FOR 6 WEEKS AND NO BETTER, NO HEART BURN ISSUES NOW    ONLY TAKING SPIRONOLACTONE FROM A DIURETIC STANDPOINT    DUE FOR LABS    HAD GROSS HEMATURIA IN HOSPITAL AND WAS SUPPOSED TO GET AN ULTRASOUND BUT IT NEVER HAPPENED  Review of Systems   Constitutional:  Negative for chills, diaphoresis and fever.   Respiratory:  Negative for cough and shortness of breath.    Cardiovascular:  Positive for leg swelling. Negative for chest pain.   Gastrointestinal:  Negative for constipation, diarrhea, nausea and vomiting.   Genitourinary:  Positive for hematuria.   Musculoskeletal:  Negative for joint swelling and myalgias.   Skin:  Positive for rash.       Objective   /68   Pulse 68   Resp 14   Ht 1.651 m (5' 5\")   Wt 90.3 kg (199 lb)   SpO2 98%   BMI 33.12 kg/m²     Physical Exam  Vitals reviewed.   Constitutional:       General: She is not in acute distress.     Appearance: She is obese. She is not ill-appearing.   Cardiovascular:      Rate and Rhythm: Normal rate and regular rhythm.      Pulses: Normal pulses.      Heart sounds:      No friction rub. No gallop.   Pulmonary:      Breath sounds: Normal breath sounds. No wheezing, rhonchi or rales.   Abdominal:      General: Abdomen is flat. Bowel sounds are normal.      Palpations: Abdomen is soft.      Tenderness: There is no guarding or rebound.   Musculoskeletal:         General: No swelling.      Right lower leg: No edema.      Left lower leg: No edema.         Assessment/Plan   Problem List Items Addressed This Visit       Hematuria    " Relevant Orders    US renal complete    Hyperlipidemia    Relevant Orders    Vitamin B12    Lipid Panel    TSH with reflex to Free T4 if abnormal    Comprehensive Metabolic Panel    CBC    Hypertensive heart disease with heart failure (CMS/HCC)     COMPENSATED CURRENTLY         Chronic obstructive pulmonary disease, unspecified COPD type (CMS/HCC)     STABLE AT PRESENT UNFORTUNATLEY CONTINUE TO SMOKE          Other Visit Diagnoses       Rash    -  Primary    Relevant Orders    Referral to Dermatology    Mild vitamin D deficiency        Relevant Orders    Vitamin D 25-Hydroxy,Total (for eval of Vitamin D levels)    Lung nodule        Relevant Orders    CT chest wo IV contrast          Patient Instructions    FASTING LABS ARE ORDERED FOR YOU    2.  REFERRAL TO DERM FOR NON-RESOLVING RASH    3.  OK FOR YOU TO TAKE A CAR TRIP AS LONG AS YOU TAKE REGULAR STOPS TO GET OUT AND WALK    4.  THE KIND OF ANKLE SWELLING YOU HAVE IS NOT FELT TO BE EVIDENCE OF UNCOMPENSATED CONGESTIVE HEART FAILURE, BUT RATHER THE NORMAL VENOUS INSUFFICIENCY SWELLING THAT MANY PEOPLE EXPERIENCE AFTER PROLONGED CRITICAL ILLNESS.  THEREFORE, DIURETICS ARE NOT FELT TO BE INDICATED AT THIS PARTICULAR TIME    5.  HAIR FRACTURE CAUSING HAIR LOSS VERY COMMONLY RESOLVES AFTER ENERGY STORES ARE REPLACED FOLLOWING A PROLONGED CRITICAL ILLNESS, AS HAIR HAS RETURNED IN YOUR CASE    6.  OK TO GET THIRD AND POSSIBLY FINAL COVID-19 IMMUNIZATION NOW.    7.  PROCEED WITH DIET/EXERCISE/WEIGHT MANAGEMENT    8.  FOLLOW UP 6 MONTHS OR AS NEEDED.    9.  LUNG CT TO MONITOR SMALL LUNG NODULE    10.  STOP SMOKING RECOMMENDED    11.  ULTRASOUND KIDNEY ORDERED TO COMPLETE WORKUP FOR HISTORICAL GROSS HEMATURIA.  OF NOTE RENAL FUNCTION IS NORMAL.

## 2024-02-16 ENCOUNTER — HOSPITAL ENCOUNTER (OUTPATIENT)
Dept: RADIOLOGY | Facility: HOSPITAL | Age: 69
Discharge: HOME | End: 2024-02-16
Payer: MEDICARE

## 2024-02-16 ENCOUNTER — LAB (OUTPATIENT)
Dept: LAB | Facility: LAB | Age: 69
End: 2024-02-16
Payer: MEDICARE

## 2024-02-16 DIAGNOSIS — R31.0 GROSS HEMATURIA: ICD-10-CM

## 2024-02-16 DIAGNOSIS — E55.9 MILD VITAMIN D DEFICIENCY: ICD-10-CM

## 2024-02-16 DIAGNOSIS — R91.1 LUNG NODULE: ICD-10-CM

## 2024-02-16 DIAGNOSIS — E78.5 HYPERLIPIDEMIA, UNSPECIFIED HYPERLIPIDEMIA TYPE: ICD-10-CM

## 2024-02-16 LAB
25(OH)D3 SERPL-MCNC: 20 NG/ML (ref 30–100)
ALBUMIN SERPL BCP-MCNC: 4.5 G/DL (ref 3.4–5)
ALP SERPL-CCNC: 73 U/L (ref 33–136)
ALT SERPL W P-5'-P-CCNC: 27 U/L (ref 7–45)
ANION GAP SERPL CALC-SCNC: 11 MMOL/L (ref 10–20)
AST SERPL W P-5'-P-CCNC: 29 U/L (ref 9–39)
BILIRUB SERPL-MCNC: 0.6 MG/DL (ref 0–1.2)
BUN SERPL-MCNC: 22 MG/DL (ref 6–23)
CALCIUM SERPL-MCNC: 9.7 MG/DL (ref 8.6–10.3)
CHLORIDE SERPL-SCNC: 99 MMOL/L (ref 98–107)
CHOLEST SERPL-MCNC: 91 MG/DL (ref 0–199)
CHOLESTEROL/HDL RATIO: 2.3
CO2 SERPL-SCNC: 30 MMOL/L (ref 21–32)
CREAT SERPL-MCNC: 0.88 MG/DL (ref 0.5–1.05)
EGFRCR SERPLBLD CKD-EPI 2021: 71 ML/MIN/1.73M*2
ERYTHROCYTE [DISTWIDTH] IN BLOOD BY AUTOMATED COUNT: 13.3 % (ref 11.5–14.5)
GLUCOSE SERPL-MCNC: 87 MG/DL (ref 74–99)
HCT VFR BLD AUTO: 49.6 % (ref 36–46)
HDLC SERPL-MCNC: 39.9 MG/DL
HGB BLD-MCNC: 16.1 G/DL (ref 12–16)
LDLC SERPL CALC-MCNC: 37 MG/DL
MCH RBC QN AUTO: 28.3 PG (ref 26–34)
MCHC RBC AUTO-ENTMCNC: 32.5 G/DL (ref 32–36)
MCV RBC AUTO: 87 FL (ref 80–100)
NON HDL CHOLESTEROL: 51 MG/DL (ref 0–149)
NRBC BLD-RTO: 0 /100 WBCS (ref 0–0)
PLATELET # BLD AUTO: 221 X10*3/UL (ref 150–450)
POTASSIUM SERPL-SCNC: 4.4 MMOL/L (ref 3.5–5.3)
PROT SERPL-MCNC: 7.9 G/DL (ref 6.4–8.2)
RBC # BLD AUTO: 5.69 X10*6/UL (ref 4–5.2)
SODIUM SERPL-SCNC: 136 MMOL/L (ref 136–145)
TRIGL SERPL-MCNC: 73 MG/DL (ref 0–149)
TSH SERPL-ACNC: 1.3 MIU/L (ref 0.44–3.98)
VIT B12 SERPL-MCNC: 422 PG/ML (ref 211–911)
VLDL: 15 MG/DL (ref 0–40)
WBC # BLD AUTO: 7.7 X10*3/UL (ref 4.4–11.3)

## 2024-02-16 PROCEDURE — 82607 VITAMIN B-12: CPT

## 2024-02-16 PROCEDURE — 71250 CT THORAX DX C-: CPT

## 2024-02-16 PROCEDURE — 76770 US EXAM ABDO BACK WALL COMP: CPT | Performed by: STUDENT IN AN ORGANIZED HEALTH CARE EDUCATION/TRAINING PROGRAM

## 2024-02-16 PROCEDURE — 36415 COLL VENOUS BLD VENIPUNCTURE: CPT

## 2024-02-16 PROCEDURE — 71250 CT THORAX DX C-: CPT | Performed by: RADIOLOGY

## 2024-02-16 PROCEDURE — 80061 LIPID PANEL: CPT

## 2024-02-16 PROCEDURE — 85027 COMPLETE CBC AUTOMATED: CPT

## 2024-02-16 PROCEDURE — 82306 VITAMIN D 25 HYDROXY: CPT

## 2024-02-16 PROCEDURE — 84443 ASSAY THYROID STIM HORMONE: CPT

## 2024-02-16 PROCEDURE — 76770 US EXAM ABDO BACK WALL COMP: CPT

## 2024-02-16 PROCEDURE — 80053 COMPREHEN METABOLIC PANEL: CPT

## 2024-02-19 ENCOUNTER — TELEPHONE (OUTPATIENT)
Dept: CARDIOLOGY | Facility: CLINIC | Age: 69
End: 2024-02-19
Payer: MEDICARE

## 2024-02-19 DIAGNOSIS — Z78.0 MENOPAUSE PRESENT: ICD-10-CM

## 2024-02-19 DIAGNOSIS — S22.000D COMPRESSION FRACTURE OF THORACIC VERTEBRA WITH ROUTINE HEALING, UNSPECIFIED THORACIC VERTEBRAL LEVEL, SUBSEQUENT ENCOUNTER: ICD-10-CM

## 2024-02-19 DIAGNOSIS — R91.8 MULTIPLE PULMONARY NODULES: Primary | ICD-10-CM

## 2024-02-19 NOTE — TELEPHONE ENCOUNTER
Called and spoke with patient in regards to results/recommendations. Patient verbalized understanding.

## 2024-02-19 NOTE — TELEPHONE ENCOUNTER
----- Message from Julio Monique MD sent at 2/18/2024  1:22 PM EST -----  Please let the patient know that her lipids look great.  No changes needed.  Follow-up as scheduled.

## 2024-02-27 ENCOUNTER — HOSPITAL ENCOUNTER (OUTPATIENT)
Dept: RADIOLOGY | Facility: HOSPITAL | Age: 69
Discharge: HOME | End: 2024-02-27
Payer: MEDICARE

## 2024-02-27 DIAGNOSIS — Z78.0 MENOPAUSE PRESENT: ICD-10-CM

## 2024-02-27 DIAGNOSIS — S22.000D COMPRESSION FRACTURE OF THORACIC VERTEBRA WITH ROUTINE HEALING, UNSPECIFIED THORACIC VERTEBRAL LEVEL, SUBSEQUENT ENCOUNTER: ICD-10-CM

## 2024-02-27 PROCEDURE — 77080 DXA BONE DENSITY AXIAL: CPT

## 2024-02-27 PROCEDURE — 77080 DXA BONE DENSITY AXIAL: CPT | Performed by: RADIOLOGY

## 2024-03-19 ENCOUNTER — OFFICE VISIT (OUTPATIENT)
Dept: PRIMARY CARE | Facility: CLINIC | Age: 69
End: 2024-03-19
Payer: MEDICARE

## 2024-03-19 VITALS
WEIGHT: 196 LBS | HEART RATE: 67 BPM | RESPIRATION RATE: 12 BRPM | SYSTOLIC BLOOD PRESSURE: 132 MMHG | DIASTOLIC BLOOD PRESSURE: 72 MMHG | HEIGHT: 65 IN | BODY MASS INDEX: 32.65 KG/M2 | OXYGEN SATURATION: 98 %

## 2024-03-19 DIAGNOSIS — M81.0 AGE-RELATED OSTEOPOROSIS WITHOUT CURRENT PATHOLOGICAL FRACTURE: Primary | ICD-10-CM

## 2024-03-19 DIAGNOSIS — I87.2 CHRONIC VENOUS INSUFFICIENCY: ICD-10-CM

## 2024-03-19 DIAGNOSIS — F17.200 NICOTINE USE DISORDER: ICD-10-CM

## 2024-03-19 DIAGNOSIS — J44.9 CHRONIC OBSTRUCTIVE PULMONARY DISEASE, UNSPECIFIED COPD TYPE (MULTI): ICD-10-CM

## 2024-03-19 DIAGNOSIS — R91.8 MULTIPLE PULMONARY NODULES: ICD-10-CM

## 2024-03-19 PROCEDURE — 3078F DIAST BP <80 MM HG: CPT | Performed by: INTERNAL MEDICINE

## 2024-03-19 PROCEDURE — 1159F MED LIST DOCD IN RCRD: CPT | Performed by: INTERNAL MEDICINE

## 2024-03-19 PROCEDURE — 3075F SYST BP GE 130 - 139MM HG: CPT | Performed by: INTERNAL MEDICINE

## 2024-03-19 PROCEDURE — 99214 OFFICE O/P EST MOD 30 MIN: CPT | Performed by: INTERNAL MEDICINE

## 2024-03-19 PROCEDURE — 1160F RVW MEDS BY RX/DR IN RCRD: CPT | Performed by: INTERNAL MEDICINE

## 2024-03-19 RX ORDER — ALENDRONATE SODIUM 70 MG/1
70 TABLET ORAL
Qty: 12 TABLET | Refills: 3 | Status: SHIPPED | OUTPATIENT
Start: 2024-03-19 | End: 2025-03-19

## 2024-03-19 ASSESSMENT — ENCOUNTER SYMPTOMS
MYALGIAS: 0
CHILLS: 0
DIARRHEA: 0
FEVER: 0
VOMITING: 0
NAUSEA: 0
JOINT SWELLING: 0
DIAPHORESIS: 0
SHORTNESS OF BREATH: 0
CONSTIPATION: 0
COUGH: 0

## 2024-03-19 NOTE — PROGRESS NOTES
"Subjective   Rupa Pinedo is a 68 y.o. female who presents for  FOLLOW UP   HPI   CT SCAN RECENTLY WITH NEW 3MM NODULES    NOTED A T6 COMPRESSION FRACTURE    BONE DENSITY DONE POSITIVE FOR OSTEOPOROSIS    ACTIVE SMOKER    NO OTHER FALLS OR FRACTURES    SHE DID HAVE DIFFICULTY HEALING HER STERNUM    Review of Systems   Constitutional:  Negative for chills, diaphoresis and fever.   Respiratory:  Negative for cough and shortness of breath.    Cardiovascular:  Negative for chest pain and leg swelling.   Gastrointestinal:  Negative for constipation, diarrhea, nausea and vomiting.   Musculoskeletal:  Negative for joint swelling and myalgias.       Objective   /72   Pulse 67   Resp 12   Ht 1.651 m (5' 5\")   Wt 88.9 kg (196 lb)   SpO2 98%   BMI 32.62 kg/m²     Physical Exam  Vitals reviewed.   Constitutional:       General: She is not in acute distress.     Appearance: She is obese. She is not ill-appearing.   Cardiovascular:      Rate and Rhythm: Normal rate and regular rhythm.      Pulses: Normal pulses.      Heart sounds:      No friction rub. No gallop.   Pulmonary:      Breath sounds: Normal breath sounds. No wheezing, rhonchi or rales.   Abdominal:      General: Abdomen is flat. Bowel sounds are normal.      Palpations: Abdomen is soft.      Tenderness: There is no guarding or rebound.   Musculoskeletal:         General: No swelling.      Right lower leg: No edema.      Left lower leg: No edema.         Assessment/Plan   Problem List Items Addressed This Visit       Chronic venous insufficiency    Multiple pulmonary nodules    Nicotine use disorder    Chronic obstructive pulmonary disease, unspecified COPD type (CMS/[facility])     Other Visit Diagnoses       Age-related osteoporosis without current pathological fracture    -  Primary    Relevant Medications    alendronate (Fosamax) 70 mg tablet          Patient Instructions    WILL START ALENDRONATE 70 MG WEEKLY TO HELP IMPROVE OSTEOPOROSIS    2.  IN ADDITION, " TAKE VITAMIN D3 2000 IU DAILY TO HELP BUILD BONE    3.  CALCIUM SUPPLEMENTATION MIGHT HELP A LITTLE, BUT PROBbly not as important as vitamin d    4.  Repeat bone density in 2-3 years to make sure the med is working    5. WE ARE DOING THIS TO PREVENT FRACTURE, BUT ALSO TO PREVENT ONGOING VERTEBRAL COMPRESSION FRACTURES THAT COULD RESULT IN WORSENING KYPHOSIS - COULD BE A PROBLEM OF BREATHING LATER IN LIFE    6.  REPEAT CT SCAN CHEST RECOMMENDED FOR 6 MONTHS TO FOLLOW UP NEW MICROSCOPIC LUNG NODULES - 3MM?

## 2024-03-19 NOTE — PATIENT INSTRUCTIONS
WILL START ALENDRONATE 70 MG WEEKLY TO HELP IMPROVE OSTEOPOROSIS    2.  IN ADDITION, TAKE VITAMIN D3 2000 IU DAILY TO HELP BUILD BONE    3.  CALCIUM SUPPLEMENTATION MIGHT HELP A LITTLE, BUT PROBbly not as important as vitamin d    4.  Repeat bone density in 2-3 years to make sure the med is working    5. WE ARE DOING THIS TO PREVENT FRACTURE, BUT ALSO TO PREVENT ONGOING VERTEBRAL COMPRESSION FRACTURES THAT COULD RESULT IN WORSENING KYPHOSIS - COULD BE A PROBLEM OF BREATHING LATER IN LIFE    6.  REPEAT CT SCAN CHEST RECOMMENDED FOR 6 MONTHS TO FOLLOW UP NEW MICROSCOPIC LUNG NODULES - 3MM?

## 2024-05-03 DIAGNOSIS — I10 ESSENTIAL HYPERTENSION: ICD-10-CM

## 2024-05-06 RX ORDER — SPIRONOLACTONE 25 MG/1
25 TABLET ORAL DAILY
Qty: 90 TABLET | Refills: 3 | Status: SHIPPED | OUTPATIENT
Start: 2024-05-06

## 2024-06-25 ENCOUNTER — TELEPHONE (OUTPATIENT)
Dept: PRIMARY CARE | Facility: CLINIC | Age: 69
End: 2024-06-25

## 2024-06-25 ENCOUNTER — OFFICE VISIT (OUTPATIENT)
Dept: PRIMARY CARE | Facility: CLINIC | Age: 69
End: 2024-06-25
Payer: MEDICARE

## 2024-06-25 VITALS
RESPIRATION RATE: 14 BRPM | BODY MASS INDEX: 31.32 KG/M2 | OXYGEN SATURATION: 96 % | WEIGHT: 188 LBS | HEIGHT: 65 IN | DIASTOLIC BLOOD PRESSURE: 76 MMHG | SYSTOLIC BLOOD PRESSURE: 118 MMHG | HEART RATE: 72 BPM

## 2024-06-25 DIAGNOSIS — R91.8 LUNG NODULES: ICD-10-CM

## 2024-06-25 DIAGNOSIS — Z95.1 S/P CABG (CORONARY ARTERY BYPASS GRAFT): ICD-10-CM

## 2024-06-25 DIAGNOSIS — Z01.818 PRE-OP EVALUATION: ICD-10-CM

## 2024-06-25 DIAGNOSIS — E55.9 VITAMIN D DEFICIENCY: Primary | ICD-10-CM

## 2024-06-25 DIAGNOSIS — I25.10 CORONARY ARTERY DISEASE INVOLVING NATIVE CORONARY ARTERY OF NATIVE HEART WITHOUT ANGINA PECTORIS: ICD-10-CM

## 2024-06-25 DIAGNOSIS — H26.9 CATARACT, UNSPECIFIED CATARACT TYPE, UNSPECIFIED LATERALITY: ICD-10-CM

## 2024-06-25 PROCEDURE — 3078F DIAST BP <80 MM HG: CPT | Performed by: INTERNAL MEDICINE

## 2024-06-25 PROCEDURE — 99214 OFFICE O/P EST MOD 30 MIN: CPT | Performed by: INTERNAL MEDICINE

## 2024-06-25 PROCEDURE — 3074F SYST BP LT 130 MM HG: CPT | Performed by: INTERNAL MEDICINE

## 2024-06-25 PROCEDURE — 1159F MED LIST DOCD IN RCRD: CPT | Performed by: INTERNAL MEDICINE

## 2024-06-25 RX ORDER — ACETAMINOPHEN 500 MG
2000 TABLET ORAL DAILY
Start: 2024-06-25

## 2024-06-25 ASSESSMENT — ENCOUNTER SYMPTOMS
FEVER: 0
DIARRHEA: 0
MYALGIAS: 0
SHORTNESS OF BREATH: 0
NAUSEA: 0
DIAPHORESIS: 0
CHILLS: 0
JOINT SWELLING: 0
COUGH: 0
CONSTIPATION: 0
VOMITING: 0

## 2024-06-25 NOTE — PROGRESS NOTES
"Subjective   Rupa Pinedo is a 68 y.o. female who presents for  MED CLEARANCE LEFT CATARACT      HPI   FEELS FINE    FOR CATARACT SURGERY    STABLE CARDIAC WISE    SMOKING STILL    Review of Systems   Constitutional:  Negative for chills, diaphoresis and fever.   Respiratory:  Negative for cough and shortness of breath.    Cardiovascular:  Negative for chest pain and leg swelling.   Gastrointestinal:  Negative for constipation, diarrhea, nausea and vomiting.   Musculoskeletal:  Negative for joint swelling and myalgias.       Objective   /76   Pulse 72   Resp 14   Ht 1.651 m (5' 5\")   Wt 85.3 kg (188 lb)   SpO2 96%   BMI 31.28 kg/m²     Physical Exam  Vitals reviewed.   Constitutional:       General: She is not in acute distress.     Appearance: She is obese. She is not ill-appearing.   Cardiovascular:      Rate and Rhythm: Normal rate and regular rhythm.      Pulses: Normal pulses.      Heart sounds:      No friction rub. No gallop.   Pulmonary:      Breath sounds: Normal breath sounds. No wheezing, rhonchi or rales.   Abdominal:      General: Abdomen is flat. Bowel sounds are normal.      Palpations: Abdomen is soft.      Tenderness: There is no guarding or rebound.   Musculoskeletal:         General: No swelling.      Right lower leg: No edema.      Left lower leg: No edema.         Assessment/Plan   Problem List Items Addressed This Visit       S/P CABG (coronary artery bypass graft)    Coronary artery disease involving native coronary artery of native heart without angina pectoris    Lung nodules    Pre-op evaluation    Cataract     Other Visit Diagnoses       Vitamin D deficiency    -  Primary    Relevant Medications    cholecalciferol (Vitamin D3) 50 mcg (2,000 unit) capsule          Patient Instructions    RECOMMEND TAKE METOPROLOL AND LISINOPRIL WITH A SIP OF WATER ON THE DAY OF SURGERY    2.  ASPIRIN DOES NOT NEED TO BE HELD IN PREPARATION FOR CATARACT SURGERY, BUT YOU DON'T NEED TO TAKE IT ON THE " DAY OF SURGERY.    3.  RECOMMEND STOP SMOKING IN PREPARATION FOR PROCEDURE.    4.  PLEASE CALL IF REFILLS ARE NEEDED    5.  YOU ARE CONSIDERED A LOW-TO-MODERATE RISK FOR THE PLANNED ELECTIVE CATARACT SURGERY, AND I AGREE WITH PROCEEDING IN ORDER TO RESTORE VISION.

## 2024-06-25 NOTE — TELEPHONE ENCOUNTER
BEN BOYD,  PLEASE LET MS. BAÑUELOS KNOW THAT REPEAT LUNG CT IS DUE 8/16/24, 6 MONTHS AFTER HER PREVIOUS ONE.  THERE SEEMS TO ALREADY BE AN ORDER IN THE SYSTEM, THX

## 2024-06-25 NOTE — PATIENT INSTRUCTIONS
RECOMMEND TAKE METOPROLOL AND LISINOPRIL WITH A SIP OF WATER ON THE DAY OF SURGERY    2.  ASPIRIN DOES NOT NEED TO BE HELD IN PREPARATION FOR CATARACT SURGERY, BUT YOU DON'T NEED TO TAKE IT ON THE DAY OF SURGERY.    3.  RECOMMEND STOP SMOKING IN PREPARATION FOR PROCEDURE.    4.  PLEASE CALL IF REFILLS ARE NEEDED    5.  YOU ARE CONSIDERED A LOW-TO-MODERATE RISK FOR THE PLANNED ELECTIVE CATARACT SURGERY, AND I AGREE WITH PROCEEDING IN ORDER TO RESTORE VISION.

## 2024-07-02 ENCOUNTER — APPOINTMENT (OUTPATIENT)
Dept: CARDIOLOGY | Facility: CLINIC | Age: 69
End: 2024-07-02
Payer: MEDICARE

## 2024-07-03 ENCOUNTER — APPOINTMENT (OUTPATIENT)
Dept: CARDIOLOGY | Facility: CLINIC | Age: 69
End: 2024-07-03
Payer: MEDICARE

## 2024-07-03 VITALS
HEART RATE: 76 BPM | BODY MASS INDEX: 31.42 KG/M2 | WEIGHT: 188.6 LBS | HEIGHT: 65 IN | DIASTOLIC BLOOD PRESSURE: 48 MMHG | OXYGEN SATURATION: 98 % | SYSTOLIC BLOOD PRESSURE: 98 MMHG

## 2024-07-03 DIAGNOSIS — I25.10 CORONARY ARTERY DISEASE INVOLVING NATIVE CORONARY ARTERY OF NATIVE HEART WITHOUT ANGINA PECTORIS: Primary | ICD-10-CM

## 2024-07-03 DIAGNOSIS — Z95.1 S/P CABG (CORONARY ARTERY BYPASS GRAFT): ICD-10-CM

## 2024-07-03 DIAGNOSIS — I10 ESSENTIAL HYPERTENSION: ICD-10-CM

## 2024-07-03 PROBLEM — R00.1 SYMPTOMATIC SINUS BRADYCARDIA: Status: ACTIVE | Noted: 2024-07-03

## 2024-07-03 PROBLEM — E55.9 VITAMIN D DEFICIENCY: Status: ACTIVE | Noted: 2022-10-11

## 2024-07-03 PROBLEM — Z97.4 WEARS HEARING AID: Status: ACTIVE | Noted: 2021-08-03

## 2024-07-03 PROBLEM — D69.6 THROMBOCYTOPENIA (CMS-HCC): Status: ACTIVE | Noted: 2024-07-03

## 2024-07-03 PROCEDURE — 1160F RVW MEDS BY RX/DR IN RCRD: CPT | Performed by: NURSE PRACTITIONER

## 2024-07-03 PROCEDURE — 1159F MED LIST DOCD IN RCRD: CPT | Performed by: NURSE PRACTITIONER

## 2024-07-03 PROCEDURE — 3074F SYST BP LT 130 MM HG: CPT | Performed by: NURSE PRACTITIONER

## 2024-07-03 PROCEDURE — 3078F DIAST BP <80 MM HG: CPT | Performed by: NURSE PRACTITIONER

## 2024-07-03 PROCEDURE — 1126F AMNT PAIN NOTED NONE PRSNT: CPT | Performed by: NURSE PRACTITIONER

## 2024-07-03 PROCEDURE — 99214 OFFICE O/P EST MOD 30 MIN: CPT | Performed by: NURSE PRACTITIONER

## 2024-07-03 ASSESSMENT — PAIN SCALES - GENERAL: PAINLEVEL: 0-NO PAIN

## 2024-07-03 NOTE — PROGRESS NOTES
Name : Rupa Pinedo   : 1955   MRN : [id number]   ENC Date : 2024    Primary Cardiologist: Dr. Monique     CC: 6 month follow up     HPI:    Rupa Pinedo is a 68 y.o. 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    Has done well since she saw Dr. Monique last. No cardiac complaints.    She used to work at [employer] as a .     CV Diagnostics:  Ambulatory monitor 2023: 48-hour monitor.  Predominantly sinus rhythm.  Nonsustained episodes of supraventricular tachycardia.  Longest was 12 beats and fastest was 3 beats at a rate of 131 bpm.  No evidence of atrial fibrillation.     Limited echocardiogram 10/10/2022: Normal LV size and function.     3V CABG 10/5/2022: LIMA to diagonal, ANTONIA to LAD and VG to ramus.    ROS: unless otherwise noted in the history of present illness, all other systems were reviewed and they are negative for complaints     Allergies:  Augmentin [amoxicillin-pot clavulanate], Blueberry, Flavoring agent, Metronidazole, Cephalexin, Ciprofloxacin, Clarithromycin, Clindamycin, Irbesartan-hydrochlorothiazide, Moxifloxacin, Sulfa (sulfonamide antibiotics), and Vancomycin    Current Outpatient Medications   Medication Instructions    alendronate (FOSAMAX) 70 mg, oral, Every 7 days, Take in the morning with a full glass of water, on an empty stomach, and do not take anything else by mouth or lie down for the next 30 min.    aspirin 81 mg EC tablet oral    atorvastatin (LIPITOR) 80 mg, oral, Daily RT    cholecalciferol (VITAMIN D3) 50 mcg, oral, Daily    lisinopril 10 mg tablet 1 tablet, oral, Daily    metoprolol succinate XL (TOPROL-XL) 25 mg, oral, Daily, Do not crush or chew.    nitroglycerin (Nitrostat) 0.4 mg SL tablet sublingual    spironolactone (ALDACTONE) 25 mg, oral, Daily    triamcinolone  "(Kenalog) 0.025 % cream 1 Application, Topical, 2 times daily        Last Labs:  CBC  Lab Results   Component Value Date    WBC 7.7 02/16/2024    HGB 16.1 (H) 02/16/2024    HCT 49.6 (H) 02/16/2024    MCV 87 02/16/2024     02/16/2024       CMP  Lab Results   Component Value Date    CALCIUM 9.7 02/16/2024    PHOS 2.8 02/23/2023    PROT 7.9 02/16/2024    ALBUMIN 4.5 02/16/2024    AST 29 02/16/2024    ALT 27 02/16/2024    ALKPHOS 73 02/16/2024    BILITOT 0.6 02/16/2024       BMP   Lab Results   Component Value Date     02/16/2024    K 4.4 02/16/2024    CL 99 02/16/2024    CO2 30 02/16/2024    GLUCOSE 87 02/16/2024    BUN 22 02/16/2024    CREATININE 0.88 02/16/2024       LIPID PANEL   Lab Results   Component Value Date    CHOL 91 02/16/2024    TRIG 73 02/16/2024    HDL 39.9 02/16/2024    CHHDL 2.3 02/16/2024    LDLF 95 05/26/2020    VLDL 15 02/16/2024    NHDL 51 02/16/2024       RENAL FUNCTION PANEL   Lab Results   Component Value Date    GLUCOSE 87 02/16/2024     02/16/2024    K 4.4 02/16/2024    CL 99 02/16/2024    CO2 30 02/16/2024    ANIONGAP 11 02/16/2024    BUN 22 02/16/2024    CREATININE 0.88 02/16/2024    CALCIUM 9.7 02/16/2024    PHOS 2.8 02/23/2023    ALBUMIN 4.5 02/16/2024        Lab Results   Component Value Date    HGBA1C 5.4 09/19/2022     I have reviewed the above labs & diagnostics    Last Recorded Vitals:  Vitals:    07/03/24 0929   BP: (!) 98/48   BP Location: Left arm   Patient Position: Sitting   BP Cuff Size: Adult   Pulse: 76   SpO2: 98%   Weight: 85.5 kg (188 lb 9.6 oz)   Height: 1.651 m (5' 5\")     Physical Exam:  On exam Ms. Rupa Pinedo appears her stated age, is alert and oriented x3, and in no acute distress. Her sclera are anicteric and her oropharynx has moist mucous membranes. Her neck is supple and without thyromegaly. The JVP is ~5 cm of water above the right atrium. Her cardiac exam has regular rhythm, normal S1, S2. No S3/4. There are no murmurs. Her lungs are clear to " auscultation bilaterally and there is no dullness to percussion. Her abdomen is soft, nontender with normoactive bowel sounds. There is no HJR. The extremities are warm and without edema. The skin is dry. There is no rash present. The distal pulses are 2-3+ in all four extremities. Her mood and affect are appropriate for todays encounter.     Assessment/Plan:  1) CAD: 3V CABG October 2022 at [facility].  Complicated by repeated sternal wound infection with Ecoli requiring redo surgery. Reports she had a wound vac in place until 08/2023. Doing well, no angina.  - c/w ASA 81 mg every day   - c/w Atorvastatin 80mg every day      2) dyslipidemia: LDL 02/2024 at goal < 70 (37). Tolerating high intensity statin.    3) paroxysmal atrial fibrillation: No evidence of recurrence by ambulatory monitoring.  Occasional palpitations which are short-lived.  Would continue to observe.     4) Hypertension: BP is low today 98/48 mmHg. This has not been her trend. Advise she increase fluids & check BP at home. If she becomes symptomatic I advise she hold her spironolactone & call my office for further evaluation.    Follow up with Dr. Monique as already scheduled     Tracy M Schwab, APRN-CNP

## 2024-07-05 ENCOUNTER — APPOINTMENT (OUTPATIENT)
Dept: CARDIOLOGY | Facility: CLINIC | Age: 69
End: 2024-07-05
Payer: MEDICARE

## 2024-07-10 ENCOUNTER — APPOINTMENT (OUTPATIENT)
Dept: DERMATOLOGY | Facility: CLINIC | Age: 69
End: 2024-07-10
Payer: MEDICARE

## 2024-07-26 ENCOUNTER — TELEPHONE (OUTPATIENT)
Dept: PRIMARY CARE | Facility: CLINIC | Age: 69
End: 2024-07-26
Payer: MEDICARE

## 2024-07-26 DIAGNOSIS — I11.0 HYPERTENSIVE HEART DISEASE WITH HEART FAILURE (MULTI): Primary | ICD-10-CM

## 2024-07-26 RX ORDER — LISINOPRIL 10 MG/1
10 TABLET ORAL DAILY
Qty: 90 TABLET | Refills: 3 | Status: SHIPPED | OUTPATIENT
Start: 2024-07-26

## 2024-07-26 NOTE — TELEPHONE ENCOUNTER
Refill 90 day supply w/Refills  to [facility]    -lisinopril 10 mg tablet 1XDAY      Last Appt 6/25/24  Next Appt 12/19/24

## 2024-08-14 ENCOUNTER — APPOINTMENT (OUTPATIENT)
Dept: PRIMARY CARE | Facility: CLINIC | Age: 69
End: 2024-08-14
Payer: MEDICARE

## 2024-09-20 ENCOUNTER — HOSPITAL ENCOUNTER (OUTPATIENT)
Dept: RADIOLOGY | Facility: HOSPITAL | Age: 69
Discharge: HOME | End: 2024-09-20
Payer: MEDICARE

## 2024-09-20 DIAGNOSIS — R91.8 MULTIPLE PULMONARY NODULES: ICD-10-CM

## 2024-09-20 PROCEDURE — 71250 CT THORAX DX C-: CPT

## 2024-11-27 DIAGNOSIS — L30.9 DERMATITIS: ICD-10-CM

## 2024-11-27 DIAGNOSIS — I25.10 CORONARY ARTERY DISEASE INVOLVING NATIVE CORONARY ARTERY OF NATIVE HEART WITHOUT ANGINA PECTORIS: ICD-10-CM

## 2024-11-27 RX ORDER — TRIAMCINOLONE ACETONIDE 1 MG/G
CREAM TOPICAL 2 TIMES DAILY
Qty: 30 G | Refills: 2 | Status: SHIPPED | OUTPATIENT
Start: 2024-11-27

## 2024-11-27 RX ORDER — METOPROLOL SUCCINATE 25 MG/1
25 TABLET, EXTENDED RELEASE ORAL DAILY
Qty: 90 TABLET | Refills: 3 | Status: SHIPPED | OUTPATIENT
Start: 2024-11-27

## 2024-12-19 ENCOUNTER — APPOINTMENT (OUTPATIENT)
Dept: PRIMARY CARE | Facility: CLINIC | Age: 69
End: 2024-12-19
Payer: MEDICARE

## 2024-12-19 VITALS
SYSTOLIC BLOOD PRESSURE: 122 MMHG | WEIGHT: 187 LBS | BODY MASS INDEX: 31.16 KG/M2 | HEIGHT: 65 IN | RESPIRATION RATE: 14 BRPM | OXYGEN SATURATION: 97 % | DIASTOLIC BLOOD PRESSURE: 74 MMHG | HEART RATE: 70 BPM

## 2024-12-19 DIAGNOSIS — S22.050D COMPRESSION FRACTURE OF T6 VERTEBRA WITH ROUTINE HEALING, SUBSEQUENT ENCOUNTER: ICD-10-CM

## 2024-12-19 DIAGNOSIS — I79.8 OTHER DISORDERS OF ARTERIES, ARTERIOLES AND CAPILLARIES IN DISEASES CLASSIFIED ELSEWHERE: ICD-10-CM

## 2024-12-19 DIAGNOSIS — R68.89: ICD-10-CM

## 2024-12-19 DIAGNOSIS — Z00.00 MEDICARE ANNUAL WELLNESS VISIT, SUBSEQUENT: Primary | ICD-10-CM

## 2024-12-19 DIAGNOSIS — E78.5 HYPERLIPIDEMIA, UNSPECIFIED HYPERLIPIDEMIA TYPE: ICD-10-CM

## 2024-12-19 DIAGNOSIS — E55.9 MILD VITAMIN D DEFICIENCY: ICD-10-CM

## 2024-12-19 DIAGNOSIS — R82.90 FOUL SMELLING URINE: ICD-10-CM

## 2024-12-19 DIAGNOSIS — M80.00XD AGE-RELATED OSTEOPOROSIS WITH CURRENT PATHOLOGICAL FRACTURE WITH ROUTINE HEALING, SUBSEQUENT ENCOUNTER: ICD-10-CM

## 2024-12-19 DIAGNOSIS — Z00.00 PREVENTATIVE HEALTH CARE: ICD-10-CM

## 2024-12-19 DIAGNOSIS — I10 ESSENTIAL HYPERTENSION: ICD-10-CM

## 2024-12-19 DIAGNOSIS — Z12.11 ENCOUNTER FOR SCREENING FOR MALIGNANT NEOPLASM OF COLON: ICD-10-CM

## 2024-12-19 PROCEDURE — 1159F MED LIST DOCD IN RCRD: CPT | Performed by: INTERNAL MEDICINE

## 2024-12-19 PROCEDURE — G0009 ADMIN PNEUMOCOCCAL VACCINE: HCPCS | Performed by: INTERNAL MEDICINE

## 2024-12-19 PROCEDURE — G0439 PPPS, SUBSEQ VISIT: HCPCS | Performed by: INTERNAL MEDICINE

## 2024-12-19 PROCEDURE — 1170F FXNL STATUS ASSESSED: CPT | Performed by: INTERNAL MEDICINE

## 2024-12-19 PROCEDURE — 1124F ACP DISCUSS-NO DSCNMKR DOCD: CPT | Performed by: INTERNAL MEDICINE

## 2024-12-19 PROCEDURE — 3074F SYST BP LT 130 MM HG: CPT | Performed by: INTERNAL MEDICINE

## 2024-12-19 PROCEDURE — 3008F BODY MASS INDEX DOCD: CPT | Performed by: INTERNAL MEDICINE

## 2024-12-19 PROCEDURE — 99213 OFFICE O/P EST LOW 20 MIN: CPT | Performed by: INTERNAL MEDICINE

## 2024-12-19 PROCEDURE — 90677 PCV20 VACCINE IM: CPT | Performed by: INTERNAL MEDICINE

## 2024-12-19 PROCEDURE — 3078F DIAST BP <80 MM HG: CPT | Performed by: INTERNAL MEDICINE

## 2024-12-19 ASSESSMENT — ACTIVITIES OF DAILY LIVING (ADL)
DRESSING: INDEPENDENT
BATHING: INDEPENDENT
TAKING_MEDICATION: INDEPENDENT
MANAGING_FINANCES: INDEPENDENT
DOING_HOUSEWORK: INDEPENDENT
GROCERY_SHOPPING: INDEPENDENT

## 2024-12-19 NOTE — PATIENT INSTRUCTIONS
FASTING LABS ARE ORDERED.  PLEASE WAIT UNTIL AFTER THE NEW YEAR, I WILL RESEARCH THE LIBIDO ISSUE AND MAKE SURE THE APPROPRIATE LABS ARE ORDERED    2.  IF WE COME TO THE CONCLUSION AFTER LABWORK THAT SPIRONOLACTONE HAS SOMETHING TO DO WITH THIS, THEN I'LL CHANGE YOU TO AN ALTERNATIVE POTASSIUM SPARING DIURETIC    3.  COLOGUARD TEST IS ORDERED FOR YOU FOR COLON CANCER SCREENING    4.  PREVNAR-20 PNEUMONIA IMMUNIZATION IS ADMINISTERED TO YOU TODAY, IT LASTS 7-10 YEARS.    5.  OTHER IMMUNIZATIONS RECOMMENDED THAT CAN BE DONE AT LOCAL PHARMACY ARE SHINGRIX AND TETANUS/PERTUSSIS BOOSTER    6.  REFERRAL ENDOCRINE TO DISCUSS ALTERNATIVE THERAPY BESIDES ALENDRONATE FOR OSTEOPOROSIS IN THE SETTING OF A THORACIC COMPRESSION FRACTURE    7.  FOLLOW UP 6 MONTHS OR AS NEEDED.

## 2024-12-19 NOTE — PROGRESS NOTES
"Subjective   Reason for Visit: Rupa Pinedo is an 69 y.o. female here for a Medicare Wellness visit.   HAD COOKIE BAKING DAY A COUPLE WAS SICK SHE ENDED UP WITH COLD HAS HAD RUNNY NOSE COUGH X 2 DAYS    STOPPED FOSAMAX 2 MONTHS AGO   DEFERS FLU SHOT               HPI  GOT A COLD    STOPPED ALENDRONATE WORRIED ABOUT THE SIDE EFFECTS, ALSO NEED DENTAL WORK    SINCE RADIATION THERAPY HASN'T BEEN ABLE TO DO MAMMOGRAM BECAUSE BREAKS OUT      Patient Care Team:  Fabiano Montero MD as PCP - General (Internal Medicine)  Fabiano Montero MD as PCP - [facility] Attributed Provider     Review of Systems   Constitutional:  Negative for chills, diaphoresis and fever.   Respiratory:  Negative for cough and shortness of breath.    Cardiovascular:  Negative for chest pain and leg swelling.   Gastrointestinal:  Negative for constipation, diarrhea, nausea and vomiting.   Musculoskeletal:  Negative for joint swelling and myalgias.       Objective   Vitals:  /74   Pulse 70   Resp 14   Ht 1.651 m (5' 5\")   Wt 84.8 kg (187 lb)   SpO2 97%   BMI 31.12 kg/m²       Physical Exam  Vitals reviewed.   Constitutional:       General: She is not in acute distress.     Appearance: She is obese. She is not ill-appearing.   Cardiovascular:      Rate and Rhythm: Normal rate and regular rhythm.      Pulses: Normal pulses.      Heart sounds:      No friction rub. No gallop.   Pulmonary:      Breath sounds: Normal breath sounds. No wheezing, rhonchi or rales.   Abdominal:      General: Abdomen is flat. Bowel sounds are normal.      Palpations: Abdomen is soft.      Tenderness: There is no guarding or rebound.   Musculoskeletal:         General: No swelling.      Right lower leg: No edema.      Left lower leg: No edema.         Assessment & Plan  Age-related osteoporosis with current pathological fracture with routine healing, subsequent encounter    Orders:    Referral to Endocrinology; Future    Compression fracture of T6 vertebra " with routine healing, subsequent encounter    Orders:    Referral to Endocrinology; Future    Preventative health care    Orders:    Pneumococcal conjugate vaccine, 20-valent (PREVNAR 20)    Essential hypertension    Orders:    Vitamin B12; Future    Lipid Panel; Future    TSH with reflex to Free T4 if abnormal; Future    Comprehensive Metabolic Panel; Future    CBC; Future    Hyperlipidemia, unspecified hyperlipidemia type    Orders:    Protein, Urine Random; Future    Mild vitamin D deficiency    Orders:    Vitamin D 25-Hydroxy,Total (for eval of Vitamin D levels); Future    Foul smelling urine    Orders:    Urinalysis with Reflex Microscopic; Future    Encounter for screening for malignant neoplasm of colon    Orders:    Cologuard® colon cancer screening; Future    Cologuard® colon cancer screening    Other disorders of arteries, arterioles and capillaries in diseases classified elsewhere  FOLLOW WITH CARDIOLOGY         Medicare annual wellness visit, subsequent         Increased libido    Orders:    Cortisol AM; Future    Testosterone; Future    DHEA-Sulfate; Future    Prolactin level; Future       Patient Instructions    FASTING LABS ARE ORDERED.  PLEASE WAIT UNTIL AFTER THE NEW YEAR, I WILL RESEARCH THE LIBIDO ISSUE AND MAKE SURE THE APPROPRIATE LABS ARE ORDERED    2.  IF WE COME TO THE CONCLUSION AFTER LABWORK THAT SPIRONOLACTONE HAS SOMETHING TO DO WITH THIS, THEN I'LL CHANGE YOU TO AN ALTERNATIVE POTASSIUM SPARING DIURETIC    3.  COLOGUARD TEST IS ORDERED FOR YOU FOR COLON CANCER SCREENING    4.  PREVNAR-20 PNEUMONIA IMMUNIZATION IS ADMINISTERED TO YOU TODAY, IT LASTS 7-10 YEARS.    5.  OTHER IMMUNIZATIONS RECOMMENDED THAT CAN BE DONE AT LOCAL PHARMACY ARE SHINGRIX AND TETANUS/PERTUSSIS BOOSTER    6.  REFERRAL ENDOCRINE TO DISCUSS ALTERNATIVE THERAPY BESIDES ALENDRONATE FOR OSTEOPOROSIS IN THE SETTING OF A THORACIC COMPRESSION FRACTURE    7.  FOLLOW UP 6 MONTHS OR AS NEEDED.

## 2024-12-30 ENCOUNTER — TELEPHONE (OUTPATIENT)
Dept: PRIMARY CARE | Facility: CLINIC | Age: 69
End: 2024-12-30
Payer: MEDICARE

## 2024-12-30 PROBLEM — Z00.00 MEDICARE ANNUAL WELLNESS VISIT, SUBSEQUENT: Status: ACTIVE | Noted: 2024-12-30

## 2024-12-30 PROBLEM — D69.6 THROMBOCYTOPENIA (CMS-HCC): Status: RESOLVED | Noted: 2024-07-03 | Resolved: 2024-12-30

## 2024-12-30 PROBLEM — I79.8 OTHER DISORDERS OF ARTERIES, ARTERIOLES AND CAPILLARIES IN DISEASES CLASSIFIED ELSEWHERE: Status: ACTIVE | Noted: 2024-12-30

## 2024-12-30 ASSESSMENT — ENCOUNTER SYMPTOMS
COUGH: 0
SHORTNESS OF BREATH: 0
CHILLS: 0
JOINT SWELLING: 0
CONSTIPATION: 0
VOMITING: 0
DIARRHEA: 0
FEVER: 0
DIAPHORESIS: 0
MYALGIAS: 0
NAUSEA: 0

## 2024-12-30 NOTE — ASSESSMENT & PLAN NOTE
Orders:    Vitamin B12; Future    Lipid Panel; Future    TSH with reflex to Free T4 if abnormal; Future    Comprehensive Metabolic Panel; Future    CBC; Future

## 2024-12-30 NOTE — TELEPHONE ENCOUNTER
BEN BOYD,  PLEASE LET MS. BAÑUELOS KNOW THAT I LOOKED UP INCREASED LIBIDO, AND HAVE ORDERED THE APPROPRIATE LABS TO BE DONE WHEN SHE GETS HER LABS FOR PHYSICAL.  ONE OF THEM IS AM CORTISOL, SO IF SHE CAN GET THE BLOOD TEST AROUND 8AM THAT WOULD BE BEST, THX

## 2025-01-02 ENCOUNTER — APPOINTMENT (OUTPATIENT)
Dept: CARDIOLOGY | Facility: CLINIC | Age: 70
End: 2025-01-02
Payer: MEDICARE

## 2025-01-02 ENCOUNTER — TELEPHONE (OUTPATIENT)
Dept: PRIMARY CARE | Facility: CLINIC | Age: 70
End: 2025-01-02

## 2025-01-02 VITALS
WEIGHT: 195 LBS | DIASTOLIC BLOOD PRESSURE: 64 MMHG | HEIGHT: 65 IN | SYSTOLIC BLOOD PRESSURE: 104 MMHG | HEART RATE: 70 BPM | OXYGEN SATURATION: 97 % | BODY MASS INDEX: 32.49 KG/M2

## 2025-01-02 DIAGNOSIS — J44.9 CHRONIC OBSTRUCTIVE PULMONARY DISEASE, UNSPECIFIED COPD TYPE (MULTI): Primary | ICD-10-CM

## 2025-01-02 DIAGNOSIS — E78.5 DYSLIPIDEMIA: ICD-10-CM

## 2025-01-02 DIAGNOSIS — I25.10 CORONARY ARTERY DISEASE INVOLVING NATIVE CORONARY ARTERY OF NATIVE HEART WITHOUT ANGINA PECTORIS: ICD-10-CM

## 2025-01-02 DIAGNOSIS — R06.02 SHORTNESS OF BREATH: ICD-10-CM

## 2025-01-02 DIAGNOSIS — I10 ESSENTIAL HYPERTENSION: ICD-10-CM

## 2025-01-02 DIAGNOSIS — I25.10 CORONARY ARTERY DISEASE INVOLVING NATIVE CORONARY ARTERY OF NATIVE HEART WITHOUT ANGINA PECTORIS: Primary | ICD-10-CM

## 2025-01-02 DIAGNOSIS — I48.0 PAROXYSMAL ATRIAL FIBRILLATION (MULTI): ICD-10-CM

## 2025-01-02 PROCEDURE — 3008F BODY MASS INDEX DOCD: CPT | Performed by: INTERNAL MEDICINE

## 2025-01-02 PROCEDURE — 1126F AMNT PAIN NOTED NONE PRSNT: CPT | Performed by: INTERNAL MEDICINE

## 2025-01-02 PROCEDURE — 93005 ELECTROCARDIOGRAM TRACING: CPT | Performed by: INTERNAL MEDICINE

## 2025-01-02 PROCEDURE — 99214 OFFICE O/P EST MOD 30 MIN: CPT | Mod: 25 | Performed by: INTERNAL MEDICINE

## 2025-01-02 PROCEDURE — 93010 ELECTROCARDIOGRAM REPORT: CPT | Performed by: INTERNAL MEDICINE

## 2025-01-02 PROCEDURE — 3078F DIAST BP <80 MM HG: CPT | Performed by: INTERNAL MEDICINE

## 2025-01-02 PROCEDURE — 99214 OFFICE O/P EST MOD 30 MIN: CPT | Performed by: INTERNAL MEDICINE

## 2025-01-02 PROCEDURE — 1160F RVW MEDS BY RX/DR IN RCRD: CPT | Performed by: INTERNAL MEDICINE

## 2025-01-02 PROCEDURE — 1123F ACP DISCUSS/DSCN MKR DOCD: CPT | Performed by: INTERNAL MEDICINE

## 2025-01-02 PROCEDURE — 3074F SYST BP LT 130 MM HG: CPT | Performed by: INTERNAL MEDICINE

## 2025-01-02 PROCEDURE — 1159F MED LIST DOCD IN RCRD: CPT | Performed by: INTERNAL MEDICINE

## 2025-01-02 RX ORDER — SPIRONOLACTONE 25 MG/1
12.5 TABLET ORAL DAILY
Start: 2025-01-02

## 2025-01-02 ASSESSMENT — PAIN SCALES - GENERAL: PAINLEVEL_OUTOF10: 0-NO PAIN

## 2025-01-02 NOTE — PATIENT INSTRUCTIONS
0.1) decrease the spironolactone to 12.5 mg once a day for the next week or 2.    2) check your blood pressures once a day as we discussed and call us in a couple of weeks with those readings.    3) if the blood pressures are still on the lower side we can always consider stopping the spironolactone altogether.

## 2025-01-02 NOTE — PROGRESS NOTES
Complaint:   No chief complaint on file.     History Of Present Illness:    Rupa Pinedo is a 69 y.o. 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    Overall doing well.  Denies any exertional chest pain or shortness of breath.      Still has occasional episodes of palpitations.  Tells me that they occur about every 3 or 4 months.  Typically occur while she is seated.  She will notice an acute onset of a fluttering in her chest.  Lasts several minutes and then spontaneously resolved.  No lightheadedness, chest pain or shortness of breath during that time.  Has not gotten up and walked around during these times.  Has not changed over the past several years.    Blood pressure has been on the lower side.  From time to time she will get lightheaded if she is moving around.    Patient seen by Tracy Schwab 7/3/2024.  I reviewed that note.    She used to work at [employer] as a .    Ambulatory monitor August 2023: 48-hour monitor.  Predominantly sinus rhythm.  Nonsustained episodes of supraventricular tachycardia.  Longest was 12 beats and fastest was 3 beats at a rate of 131 bpm.  No evidence of atrial fibrillation.    Limited echocardiogram 10/10/2022: Normal LV size and function.    3V CABG 10/5/2022: LIMA to diagonal, ANTONIA to LAD and VG to ramus.     Past Medical History:  She has a past medical history of Breast cancer (Multi).    Past Surgical History:  She has a past surgical history that includes CT angio abdomen pelvis w and or wo IV IV contrast (2/1/2023).      Social History:  She reports that she has been smoking cigarettes. She has never used smokeless tobacco. She reports that she does not currently use alcohol. She reports that she does not currently use drugs.    Family History:  Family  "History   Problem Relation Name Age of Onset    Diabetes Mother      Prostate cancer Father      Diabetes Father      Diabetes Sister      Kidney cancer Brother      Diabetes Brother          Allergies:  Augmentin [amoxicillin-pot clavulanate], Blueberry, Flavoring agent, Metronidazole, Cephalexin, Ciprofloxacin, Clarithromycin, Clindamycin, Irbesartan-hydrochlorothiazide, Moxifloxacin, Sulfa (sulfonamide antibiotics), and Vancomycin    Outpatient Medications:  Current Outpatient Medications   Medication Instructions    aspirin 81 mg, Daily    atorvastatin (LIPITOR) 80 mg, Daily RT    cholecalciferol (VITAMIN D3) 50 mcg, oral, Daily    lisinopril 10 mg, oral, Daily    metoprolol succinate XL (TOPROL-XL) 25 mg, oral, Daily, Swallow whole. Do not chew or crush    nitroglycerin (NITROSTAT) 0.4 mg, As needed    spironolactone (ALDACTONE) 25 mg, oral, Daily    triamcinolone (Kenalog) 0.025 % cream 1 Application, 2 times daily    triamcinolone (Kenalog) 0.1 % cream Topical, 2 times daily, Apply to affected area 1-2 times daily as needed. Avoid face and groin.       Last Recorded Vitals:  Visit Vitals  /64 (BP Location: Left arm, Patient Position: Sitting, BP Cuff Size: Adult)   Pulse 70   Ht 1.651 m (5' 5\")   Wt 88.5 kg (195 lb)   SpO2 97%   BMI 32.45 kg/m²   Smoking Status Every Day   BSA 2.01 m²      LASTWT(3):   Wt Readings from Last 3 Encounters:   01/02/25 88.5 kg (195 lb)   12/19/24 84.8 kg (187 lb)   07/03/24 85.5 kg (188 lb 9.6 oz)       Physical Exam:  In general: alert and in no acute distress.   HEENT: Carotid upstrokes normal with no bruits. JVP is normal.   Pulmonary: Clear to auscultation bilaterally.  Cardiovascular: S1,S2, regular. No appreciable murmurs, rubs or gallops.   Lower extremities: Warm. 2+ distal pulses. No edema.     Last Labs:  CBC -  Recent Labs     02/16/24  0901 08/09/23  1116 02/23/23  0539   WBC 7.7 6.1 4.6   HGB 16.1* 15.2 8.7*   HCT 49.6* 48.1* 29.4*    226 239   MCV 87 89 " 93       CMP -  Recent Labs     02/16/24  0901 08/09/23  1116 05/16/23  0902 04/07/23  0942 02/23/23  0539    142 139   < > 143   K 4.4 4.2 4.6   < > 3.8   CL 99 105 103   < > 106   CO2 30 24 29   < > 32   ANIONGAP 11 17 12   < > 9*   BUN 22 24* 13   < > 9   CREATININE 0.88 0.82 0.86   < > 0.52   EGFR 71  --   --   --   --    MG  --  2.06 2.09  --  2.13    < > = values in this interval not displayed.     Recent Labs     02/16/24  0901 08/09/23  1116 02/22/23  0944 02/21/23  2255   ALBUMIN 4.5 4.4 2.6* 2.8*   ALKPHOS 73 72  --  55   ALT 27 18  --  24   AST 29 20  --  41*   BILITOT 0.6 0.6  --  0.6       LIPID PANEL -   Recent Labs     02/16/24  0901 05/26/20  0711   CHOL 91 153   LDLCALC 37  --    LDLF  --  95   HDL 39.9 45.0   TRIG 73 63       Recent Labs     09/19/22  0838 05/26/20  0711   HGBA1C 5.4 5.5           Assessment/Plan   1) CAD: 3V CABG October 2022 at [facility].  Complicated by sternal wound infection requiring redo surgery.  Has had no exertional chest pain or shortness of breath.  Continue with baby aspirin and Toprol-XL 25 mg daily.    2) dyslipidemia: LDL has been very well-controlled.  Continue atorvastatin 80 mg daily.    3) paroxysmal atrial fibrillation: No evidence of recurrence by ambulatory monitoring.  Occasional palpitations which are short-lived.  Overall burden of the palpitations is very low.  Will continue to observe.  If frequency or duration of palpitations increase then we can consider ambulatory monitoring.    4) hypertension: Blood pressure has been on the lower side.  Asked her to decrease the spironolactone to 12.5 mg once a day and call us with blood pressure readings over the next week or 2.  Can always consider stopping the spironolactone altogether.    5) follow-up: 6 months or sooner if needed      Julio Monique MD

## 2025-01-10 ENCOUNTER — LAB (OUTPATIENT)
Dept: LAB | Facility: LAB | Age: 70
End: 2025-01-10
Payer: MEDICARE

## 2025-01-10 DIAGNOSIS — E55.9 MILD VITAMIN D DEFICIENCY: ICD-10-CM

## 2025-01-10 DIAGNOSIS — I10 ESSENTIAL HYPERTENSION: ICD-10-CM

## 2025-01-10 DIAGNOSIS — R68.89: ICD-10-CM

## 2025-01-10 DIAGNOSIS — R82.90 FOUL SMELLING URINE: ICD-10-CM

## 2025-01-10 DIAGNOSIS — E78.5 HYPERLIPIDEMIA, UNSPECIFIED HYPERLIPIDEMIA TYPE: ICD-10-CM

## 2025-01-10 LAB
25(OH)D3 SERPL-MCNC: 64 NG/ML (ref 30–100)
ALBUMIN SERPL BCP-MCNC: 4.3 G/DL (ref 3.4–5)
ALP SERPL-CCNC: 53 U/L (ref 33–136)
ALT SERPL W P-5'-P-CCNC: 20 U/L (ref 7–45)
ANION GAP SERPL CALC-SCNC: 13 MMOL/L (ref 10–20)
APPEARANCE UR: CLEAR
AST SERPL W P-5'-P-CCNC: 27 U/L (ref 9–39)
BILIRUB SERPL-MCNC: 0.7 MG/DL (ref 0–1.2)
BILIRUB UR STRIP.AUTO-MCNC: NEGATIVE MG/DL
BUN SERPL-MCNC: 28 MG/DL (ref 6–23)
CALCIUM SERPL-MCNC: 9.4 MG/DL (ref 8.6–10.3)
CHLORIDE SERPL-SCNC: 105 MMOL/L (ref 98–107)
CHOLEST SERPL-MCNC: 104 MG/DL (ref 0–199)
CHOLESTEROL/HDL RATIO: 2.3
CO2 SERPL-SCNC: 25 MMOL/L (ref 21–32)
COLOR UR: YELLOW
CORTIS AM PEAK SERPL-MSCNC: 17 UG/DL (ref 5–20)
CREAT SERPL-MCNC: 1.06 MG/DL (ref 0.5–1.05)
CREAT UR-MCNC: 167.9 MG/DL (ref 20–320)
DHEA-S SERPL-MCNC: 32 UG/DL (ref 13–130)
EGFRCR SERPLBLD CKD-EPI 2021: 56 ML/MIN/1.73M*2
ERYTHROCYTE [DISTWIDTH] IN BLOOD BY AUTOMATED COUNT: 13 % (ref 11.5–14.5)
GLUCOSE SERPL-MCNC: 95 MG/DL (ref 74–99)
GLUCOSE UR STRIP.AUTO-MCNC: NORMAL MG/DL
HCT VFR BLD AUTO: 47.6 % (ref 36–46)
HDLC SERPL-MCNC: 45.8 MG/DL
HGB BLD-MCNC: 15.5 G/DL (ref 12–16)
KETONES UR STRIP.AUTO-MCNC: NEGATIVE MG/DL
LDLC SERPL CALC-MCNC: 44 MG/DL
LEUKOCYTE ESTERASE UR QL STRIP.AUTO: ABNORMAL
MCH RBC QN AUTO: 29.5 PG (ref 26–34)
MCHC RBC AUTO-ENTMCNC: 32.6 G/DL (ref 32–36)
MCV RBC AUTO: 91 FL (ref 80–100)
MUCOUS THREADS #/AREA URNS AUTO: NORMAL /LPF
NITRITE UR QL STRIP.AUTO: NEGATIVE
NON HDL CHOLESTEROL: 58 MG/DL (ref 0–149)
NRBC BLD-RTO: 0 /100 WBCS (ref 0–0)
PH UR STRIP.AUTO: 5 [PH]
PLATELET # BLD AUTO: 216 X10*3/UL (ref 150–450)
POTASSIUM SERPL-SCNC: 4.8 MMOL/L (ref 3.5–5.3)
PROLACTIN SERPL-MCNC: 5.4 UG/L (ref 3–20)
PROT SERPL-MCNC: 7.6 G/DL (ref 6.4–8.2)
PROT UR STRIP.AUTO-MCNC: NEGATIVE MG/DL
PROT UR-ACNC: 8 MG/DL (ref 5–24)
PROT/CREAT UR: 0.05 MG/MG CREAT (ref 0–0.17)
RBC # BLD AUTO: 5.26 X10*6/UL (ref 4–5.2)
RBC # UR STRIP.AUTO: NEGATIVE /UL
RBC #/AREA URNS AUTO: NORMAL /HPF
SODIUM SERPL-SCNC: 138 MMOL/L (ref 136–145)
SP GR UR STRIP.AUTO: 1.02
SQUAMOUS #/AREA URNS AUTO: NORMAL /HPF
TESTOST SERPL-MCNC: <30 NG/DL (ref 0–70)
TRIGL SERPL-MCNC: 70 MG/DL (ref 0–149)
TSH SERPL-ACNC: 1 MIU/L (ref 0.44–3.98)
UROBILINOGEN UR STRIP.AUTO-MCNC: NORMAL MG/DL
VIT B12 SERPL-MCNC: 335 PG/ML (ref 211–911)
VLDL: 14 MG/DL (ref 0–40)
WBC # BLD AUTO: 7.7 X10*3/UL (ref 4.4–11.3)
WBC #/AREA URNS AUTO: NORMAL /HPF

## 2025-01-10 PROCEDURE — 36415 COLL VENOUS BLD VENIPUNCTURE: CPT

## 2025-01-10 PROCEDURE — 80053 COMPREHEN METABOLIC PANEL: CPT

## 2025-01-10 PROCEDURE — 82306 VITAMIN D 25 HYDROXY: CPT

## 2025-01-10 PROCEDURE — 82607 VITAMIN B-12: CPT

## 2025-01-10 PROCEDURE — 82533 TOTAL CORTISOL: CPT

## 2025-01-10 PROCEDURE — 84146 ASSAY OF PROLACTIN: CPT

## 2025-01-10 PROCEDURE — 81001 URINALYSIS AUTO W/SCOPE: CPT

## 2025-01-10 PROCEDURE — 84403 ASSAY OF TOTAL TESTOSTERONE: CPT

## 2025-01-10 PROCEDURE — 82627 DEHYDROEPIANDROSTERONE: CPT

## 2025-01-10 PROCEDURE — 84156 ASSAY OF PROTEIN URINE: CPT

## 2025-01-10 PROCEDURE — 85027 COMPLETE CBC AUTOMATED: CPT

## 2025-01-10 PROCEDURE — 84443 ASSAY THYROID STIM HORMONE: CPT

## 2025-01-10 PROCEDURE — 80061 LIPID PANEL: CPT

## 2025-01-10 PROCEDURE — 82570 ASSAY OF URINE CREATININE: CPT

## 2025-01-11 LAB — NONINV COLON CA DNA+OCC BLD SCRN STL QL: NEGATIVE

## 2025-01-13 ENCOUNTER — TELEPHONE (OUTPATIENT)
Dept: PRIMARY CARE | Facility: CLINIC | Age: 70
End: 2025-01-13
Payer: MEDICARE

## 2025-01-16 ENCOUNTER — TELEPHONE (OUTPATIENT)
Dept: CARDIOLOGY | Facility: CLINIC | Age: 70
End: 2025-01-16
Payer: MEDICARE

## 2025-01-16 NOTE — TELEPHONE ENCOUNTER
BP readings and Heart Rate beginning Jan 6, 2025.  All morning readings  100/65 HR 71,  98/62 HR 66,  108/69 HR 73 98/65 HR 78,  123/73 HR 91,  105/67 HR 77,  115/72 HR 67,  108/68 HR 62,  122/71 HR 73,  103/61 HR 74.    Also she reduced the spironolactone on Jan 4

## 2025-01-31 ENCOUNTER — TELEPHONE (OUTPATIENT)
Dept: CARDIOLOGY | Facility: CLINIC | Age: 70
End: 2025-01-31
Payer: MEDICARE

## 2025-01-31 DIAGNOSIS — I10 ESSENTIAL HYPERTENSION: ICD-10-CM

## 2025-01-31 NOTE — TELEPHONE ENCOUNTER
Blood pressure Readings   100/62  107/63  120/74  113/71  110/69  104/66  108/64  123/75  112/66  117/65  120/66  117/68  123/71

## 2025-02-03 DIAGNOSIS — I11.0 HYPERTENSIVE HEART DISEASE WITH HEART FAILURE: ICD-10-CM

## 2025-02-03 RX ORDER — LISINOPRIL 10 MG/1
5 TABLET ORAL DAILY
Start: 2025-02-03 | End: 2025-02-03 | Stop reason: ALTCHOICE

## 2025-02-04 RX ORDER — SPIRONOLACTONE 25 MG/1
12.5 TABLET ORAL DAILY
COMMUNITY
Start: 2025-02-03

## 2025-02-07 ENCOUNTER — TELEPHONE (OUTPATIENT)
Dept: CARDIOLOGY | Facility: CLINIC | Age: 70
End: 2025-02-07
Payer: MEDICARE

## 2025-02-07 NOTE — TELEPHONE ENCOUNTER
Pt calling in her BP reading from 02/04/25. 7am readings  132/75 HR 62, 132/80 HR 61,  132/78 HR 60, 132/80 HR 68  She stopped Lisinopril on the 3rd and stared Spironolactone on the 4th.  The swelling in her legs and feet have gone down but they are still swelling.

## 2025-02-18 ENCOUNTER — TELEPHONE (OUTPATIENT)
Dept: CARDIOLOGY | Facility: CLINIC | Age: 70
End: 2025-02-18
Payer: MEDICARE

## 2025-02-18 NOTE — TELEPHONE ENCOUNTER
Rupa called today;    She increased Spirolactone to 25mg on 02/08. Below are her BP reading since increase. Please advise.     Feb 8: 141/89 Pulse 63  Feb 9: 136/83 pulse 70  Feb 10: 147/82 pulse 69  Feb 11: 132/74 pulse 74  Feb 12: 142/79 pulse 66  Feb 13: 127/82 pulse 68  Feb 14: 113/71 pulse 77  Feb 15: 146/83 pulse 65  Feb: 16: 128/76 pulse 65  Feb 17: 119/70 pulse 77  Feb 18: 144/84 pulse 70

## 2025-05-01 ENCOUNTER — APPOINTMENT (OUTPATIENT)
Dept: RADIOLOGY | Facility: HOSPITAL | Age: 70
End: 2025-05-01
Payer: MEDICARE

## 2025-05-01 ENCOUNTER — HOSPITAL ENCOUNTER (EMERGENCY)
Facility: HOSPITAL | Age: 70
Discharge: HOME | End: 2025-05-01
Attending: EMERGENCY MEDICINE
Payer: MEDICARE

## 2025-05-01 ENCOUNTER — APPOINTMENT (OUTPATIENT)
Dept: CARDIOLOGY | Facility: HOSPITAL | Age: 70
End: 2025-05-01
Payer: MEDICARE

## 2025-05-01 VITALS
HEART RATE: 70 BPM | BODY MASS INDEX: 33.24 KG/M2 | TEMPERATURE: 96.3 F | SYSTOLIC BLOOD PRESSURE: 130 MMHG | OXYGEN SATURATION: 95 % | DIASTOLIC BLOOD PRESSURE: 77 MMHG | WEIGHT: 199.52 LBS | HEIGHT: 65 IN | RESPIRATION RATE: 18 BRPM

## 2025-05-01 DIAGNOSIS — R10.9 ABDOMINAL CRAMPING: Primary | ICD-10-CM

## 2025-05-01 LAB
ALBUMIN SERPL BCP-MCNC: 4.5 G/DL (ref 3.4–5)
ALP SERPL-CCNC: 49 U/L (ref 33–136)
ALT SERPL W P-5'-P-CCNC: 22 U/L (ref 7–45)
ANION GAP SERPL CALC-SCNC: 12 MMOL/L (ref 10–20)
APTT PPP: 31 SECONDS (ref 26–36)
AST SERPL W P-5'-P-CCNC: 26 U/L (ref 9–39)
BASOPHILS # BLD AUTO: 0.04 X10*3/UL (ref 0–0.1)
BASOPHILS NFR BLD AUTO: 0.5 %
BILIRUB DIRECT SERPL-MCNC: 0.1 MG/DL (ref 0–0.3)
BILIRUB SERPL-MCNC: 0.5 MG/DL (ref 0–1.2)
BUN SERPL-MCNC: 16 MG/DL (ref 6–23)
CALCIUM SERPL-MCNC: 9.5 MG/DL (ref 8.6–10.3)
CHLORIDE SERPL-SCNC: 101 MMOL/L (ref 98–107)
CO2 SERPL-SCNC: 31 MMOL/L (ref 21–32)
CREAT SERPL-MCNC: 0.86 MG/DL (ref 0.5–1.05)
EGFRCR SERPLBLD CKD-EPI 2021: 72 ML/MIN/1.73M*2
EOSINOPHIL # BLD AUTO: 0.07 X10*3/UL (ref 0–0.4)
EOSINOPHIL NFR BLD AUTO: 0.9 %
ERYTHROCYTE [DISTWIDTH] IN BLOOD BY AUTOMATED COUNT: 13.2 % (ref 11.5–14.5)
GLUCOSE SERPL-MCNC: 88 MG/DL (ref 74–99)
HCT VFR BLD AUTO: 52.8 % (ref 36–46)
HEMOCCULT SP1 STL QL: NEGATIVE
HGB BLD-MCNC: 17.1 G/DL (ref 12–16)
HOLD SPECIMEN: 293
HOLD SPECIMEN: 293
IMM GRANULOCYTES # BLD AUTO: 0.03 X10*3/UL (ref 0–0.5)
IMM GRANULOCYTES NFR BLD AUTO: 0.4 % (ref 0–0.9)
INR PPP: 1 (ref 0.9–1.1)
LIPASE SERPL-CCNC: 56 U/L (ref 9–82)
LYMPHOCYTES # BLD AUTO: 1.81 X10*3/UL (ref 0.8–3)
LYMPHOCYTES NFR BLD AUTO: 22 %
MAGNESIUM SERPL-MCNC: 1.99 MG/DL (ref 1.6–2.4)
MCH RBC QN AUTO: 28.4 PG (ref 26–34)
MCHC RBC AUTO-ENTMCNC: 32.4 G/DL (ref 32–36)
MCV RBC AUTO: 88 FL (ref 80–100)
MONOCYTES # BLD AUTO: 0.49 X10*3/UL (ref 0.05–0.8)
MONOCYTES NFR BLD AUTO: 6 %
NEUTROPHILS # BLD AUTO: 5.77 X10*3/UL (ref 1.6–5.5)
NEUTROPHILS NFR BLD AUTO: 70.2 %
NRBC BLD-RTO: 0 /100 WBCS (ref 0–0)
PLATELET # BLD AUTO: 208 X10*3/UL (ref 150–450)
POTASSIUM SERPL-SCNC: 3.5 MMOL/L (ref 3.5–5.3)
PROT SERPL-MCNC: 8 G/DL (ref 6.4–8.2)
PROTHROMBIN TIME: 11.5 SECONDS (ref 9.8–12.4)
RBC # BLD AUTO: 6.02 X10*6/UL (ref 4–5.2)
SODIUM SERPL-SCNC: 140 MMOL/L (ref 136–145)
WBC # BLD AUTO: 8.2 X10*3/UL (ref 4.4–11.3)

## 2025-05-01 PROCEDURE — 80053 COMPREHEN METABOLIC PANEL: CPT

## 2025-05-01 PROCEDURE — 74177 CT ABD & PELVIS W/CONTRAST: CPT

## 2025-05-01 PROCEDURE — 99285 EMERGENCY DEPT VISIT HI MDM: CPT | Mod: 25 | Performed by: EMERGENCY MEDICINE

## 2025-05-01 PROCEDURE — 83735 ASSAY OF MAGNESIUM: CPT

## 2025-05-01 PROCEDURE — 2550000001 HC RX 255 CONTRASTS: Performed by: EMERGENCY MEDICINE

## 2025-05-01 PROCEDURE — 80048 BASIC METABOLIC PNL TOTAL CA: CPT

## 2025-05-01 PROCEDURE — 74177 CT ABD & PELVIS W/CONTRAST: CPT | Performed by: RADIOLOGY

## 2025-05-01 PROCEDURE — 99285 EMERGENCY DEPT VISIT HI MDM: CPT

## 2025-05-01 PROCEDURE — 83690 ASSAY OF LIPASE: CPT

## 2025-05-01 PROCEDURE — 36415 COLL VENOUS BLD VENIPUNCTURE: CPT

## 2025-05-01 PROCEDURE — 93005 ELECTROCARDIOGRAM TRACING: CPT

## 2025-05-01 PROCEDURE — 84075 ASSAY ALKALINE PHOSPHATASE: CPT

## 2025-05-01 PROCEDURE — 82270 OCCULT BLOOD FECES: CPT

## 2025-05-01 PROCEDURE — 85610 PROTHROMBIN TIME: CPT

## 2025-05-01 PROCEDURE — 85025 COMPLETE CBC W/AUTO DIFF WBC: CPT

## 2025-05-01 RX ADMIN — IOHEXOL 75 ML: 350 INJECTION, SOLUTION INTRAVENOUS at 12:59

## 2025-05-01 ASSESSMENT — PAIN - FUNCTIONAL ASSESSMENT: PAIN_FUNCTIONAL_ASSESSMENT: 0-10

## 2025-05-01 ASSESSMENT — LIFESTYLE VARIABLES
TOTAL SCORE: 0
EVER HAD A DRINK FIRST THING IN THE MORNING TO STEADY YOUR NERVES TO GET RID OF A HANGOVER: NO
HAVE YOU EVER FELT YOU SHOULD CUT DOWN ON YOUR DRINKING: NO
HAVE PEOPLE ANNOYED YOU BY CRITICIZING YOUR DRINKING: NO
EVER FELT BAD OR GUILTY ABOUT YOUR DRINKING: NO

## 2025-05-01 NOTE — ED PROVIDER NOTES
History of Present Illness       History provided by: Patient  Limitations to History: None  External Records Reviewed with Brief Summary: Notes reviewed in patient's chart    HPI:  HPI     Rupa Pinedo is a 69-year-old female with past medical history of paroxysmal A-fib, CAD, COPD, tobacco use, s/p CABG, on ASA 81, previous duodenal ulcer with hemorrhage in 2023 presenting for dark stools.  Patient states on April 29 patient ate a salad in which she has an allergy to sulfa.  States there after it felt like she was eating razors endorsing abdominal cramping.  States shortly after she did pass stool with bright red blood.  States in the 30th she became bloating with abdominal pressure, did pass stool in which she noted blood within it as well as on tissue.  States today she had another bowel movement and noted bright red blood while wiping, dark stool, dark red blood in the toilet.  Patient states stool has been formed, not diarrhea.  States eating does not make her abdominal cramping better or worse.  Patient denies hemoptysis, vomiting, rectal pain, chest pain, headache, weakness, shortness of breath, lightheadedness, syncope, dizziness.    Physical Exam   Physical Exam  Constitutional:       Appearance: Normal appearance.   HENT:      Head: Normocephalic.      Nose: Nose normal.      Mouth/Throat:      Mouth: Mucous membranes are moist.   Cardiovascular:      Rate and Rhythm: Normal rate and regular rhythm.   Pulmonary:      Effort: Pulmonary effort is normal.      Breath sounds: Normal breath sounds.   Chest:      Chest wall: No tenderness or crepitus.   Abdominal:      General: Bowel sounds are normal. There is no distension.      Palpations: Abdomen is soft. There is no mass.      Tenderness: There is no abdominal tenderness.   Genitourinary:     Rectum: Normal. Guaiac result negative. No mass, tenderness, anal fissure, external hemorrhoid or internal hemorrhoid. Normal anal tone.   Musculoskeletal:       Cervical back: Normal range of motion.   Skin:     General: Skin is warm.   Neurological:      General: No focal deficit present.      Mental Status: She is alert and oriented to person, place, and time.      Gait: Gait is intact.   Psychiatric:         Mood and Affect: Mood normal.         Behavior: Behavior normal.          Triage vitals:  T 35.7 °C (96.3 °F)  HR 72  /81  RR 18  O2 97 % None (Room air)      Medical Decision Making & ED Course     ED Course & MDM       Medical Decision Making:  Medical Decision Making  Rupa Pinedo is a 69-year-old female with past medical history of paroxysmal A-fib, CAD, COPD, tobacco use, s/p CABG, on ASA 81, previous duodenal ulcer with hemorrhage in 2023 presenting for dark stools.  Denies new iron supplements and bismuth usage.  I did perform a chaperoned rectal exam in which I did not note occult blood on my finger, I did send a stool sample in and which was negative.  No obvious hemorrhoids or fissures noted.  No masses felt on palpation.  Overall patient is well-appearing in no acute distress.  Abdomen nontender to palpation.  Heart and breath sounds normal.    CBC without leukocytosis or anemia, hemoglobin 17, hematocrit 52.  Hepatic function normal.  Coag screen within limits.  Lipase 56, low suspicion for pancreatitis.  BNP within limits. Occult blood stool negative. CT abdomen pelvis without acute intra-abdominal changes.  There is mild distal colonic diverticulosis without evidence of diverticulitis.  No sign of periduodenal infiltration or pneumoperitoneum.  Stable bile duct dilation.  Unchanged adrenal gland nodule.  Stable parapelvic cyst in the left lower kidney.    At this time patient does not meet urgent/emergent treatment.  I have discussed with her findings including elevated hemoglobin and hematocrit which are stable; no anemia or transfusion required.  Patient has been given the phone number to schedule a outpatient GI follow-up.  Discussed with her  "to return to ED if she develops new or worsening symptoms.  Patient discharged in stable condition, well-appearing, stable vital signs and agreed to this plan.      ----      Differential diagnoses considered include but are not limited to: Upper/lower GI bleed, basement/iron ingestion, nonbloody stool       EKG Independent Interpretation: EKG per my read shows normal sinus rhythm, 67 bpm, , QRS 70, QTc 422.  Normal axis.  No acute ST elevation or depression.    Independent Result Review and Interpretation: Relevant laboratory and radiographic results were reviewed and independently interpreted by myself.  As necessary, they are commented on in the ED Course.    Visit Vitals  /70   Pulse 67   Temp 35.7 °C (96.3 °F) (Temporal)   Resp 18   Ht 1.651 m (5' 5\")   Wt 90.5 kg (199 lb 8.3 oz)   SpO2 94%   BMI 33.20 kg/m²   Smoking Status Every Day   BSA 2.04 m²        Labs Reviewed   CBC WITH AUTO DIFFERENTIAL - Abnormal       Result Value    WBC 8.2      nRBC 0.0      RBC 6.02 (*)     Hemoglobin 17.1 (*)     Hematocrit 52.8 (*)     MCV 88      MCH 28.4      MCHC 32.4      RDW 13.2      Platelets 208      Neutrophils % 70.2      Immature Granulocytes %, Automated 0.4      Lymphocytes % 22.0      Monocytes % 6.0      Eosinophils % 0.9      Basophils % 0.5      Neutrophils Absolute 5.77 (*)     Immature Granulocytes Absolute, Automated 0.03      Lymphocytes Absolute 1.81      Monocytes Absolute 0.49      Eosinophils Absolute 0.07      Basophils Absolute 0.04     OCCULT BLOOD X1, STOOL - Normal    Occult Blood, Stool X1 Negative     MAGNESIUM - Normal    Magnesium 1.99     LIPASE - Normal    Lipase 56      Narrative:     Venipuncture immediately after or during the administration of Metamizole may lead to falsely low results. Testing should be performed immediately prior to Metamizole dosing.   BASIC METABOLIC PANEL - Normal    Glucose 88      Sodium 140      Potassium 3.5      Chloride 101      Bicarbonate 31      " Anion Gap 12      Urea Nitrogen 16      Creatinine 0.86      eGFR 72      Calcium 9.5     HEPATIC FUNCTION PANEL - Normal    Albumin 4.5      Bilirubin, Total 0.5      Bilirubin, Direct 0.1      Alkaline Phosphatase 49      ALT 22      AST 26      Total Protein 8.0     COAGULATION SCREEN - Normal    Protime 11.5      INR 1.0      aPTT 31      Narrative:     The APTT is no longer used for monitoring Unfractionated Heparin Therapy. For monitoring Heparin Therapy, use the Heparin Assay.       CT abdomen pelvis w IV contrast   Final Result   Status post cholecystectomy. There is pronounced intrahepatic or   extrahepatic bile duct dilatation with the common duct reaching 2.2   cm in diameter. This appearance is stable from 02/01/2023.        Fullness of the adrenal glands with ovoid low-density nodule in the   anterior limb on the right measuring 1.7 x 1.0 cm in diameter. These   findings are also likely unchanged in the interval.        2.5 x 1.8 cm parapelvic cyst in the lower pole left kidney stable   from the previous study.        Mild distal colonic diverticulosis. There is no evidence of   diverticulitis.        No sign of periduodenal infiltration or pneumoperitoneum.        Compression deformities at the superior endplate of L2 and L3 are   likely stable from 02/01/2023.             MACRO:   None        Signed by: Hubert Carlisle 5/1/2025 1:37 PM   Dictation workstation:   [id number]          ED Course:  Diagnoses as of 05/01/25 1428   Abdominal cramping     Disposition     As a result of the work-up, the patient was discharged home.  she was informed of his diagnosis and instructed to come back with any concerns or worsening of condition.  she was agreeable to the plan as discussed above.  she was given the opportunity to ask questions.  All of the patient's questions were answered.      Procedures   Procedures    This was a shared visit with an ED attending.  The patient was seen and discussed with the  ED attending    Dea Dawson PA-C  Emergency Medicine      Dea Dawson PA-C  05/01/25 [id number]

## 2025-05-01 NOTE — DISCHARGE INSTRUCTIONS
You were seen here for possible blood in the stool, I did obtain a stool sample which was negative for occult blood.  Follow-up with gastroenterology as soon as possible.  I recommend you stay well-hydrated and obtain adequate rest.  Return to ED for new or worsening symptoms.

## 2025-05-02 LAB
ATRIAL RATE: 67 BPM
P AXIS: 75 DEGREES
P OFFSET: 203 MS
P ONSET: 153 MS
PR INTERVAL: 152 MS
Q ONSET: 229 MS
QRS COUNT: 11 BEATS
QRS DURATION: 70 MS
QT INTERVAL: 400 MS
QTC CALCULATION(BAZETT): 422 MS
QTC FREDERICIA: 415 MS
R AXIS: 44 DEGREES
T AXIS: 63 DEGREES
T OFFSET: 429 MS
VENTRICULAR RATE: 67 BPM

## 2025-05-07 ENCOUNTER — OFFICE VISIT (OUTPATIENT)
Dept: GASTROENTEROLOGY | Facility: CLINIC | Age: 70
End: 2025-05-07
Payer: MEDICARE

## 2025-05-07 VITALS
RESPIRATION RATE: 18 BRPM | DIASTOLIC BLOOD PRESSURE: 72 MMHG | OXYGEN SATURATION: 96 % | SYSTOLIC BLOOD PRESSURE: 144 MMHG | HEIGHT: 65 IN | WEIGHT: 199.7 LBS | HEART RATE: 76 BPM | BODY MASS INDEX: 33.27 KG/M2

## 2025-05-07 DIAGNOSIS — K62.5 BRBPR (BRIGHT RED BLOOD PER RECTUM): Primary | ICD-10-CM

## 2025-05-07 DIAGNOSIS — R10.84 GENERALIZED ABDOMINAL PAIN: ICD-10-CM

## 2025-05-07 DIAGNOSIS — R19.5 DARK STOOLS: ICD-10-CM

## 2025-05-07 DIAGNOSIS — Z87.11 HISTORY OF PEPTIC ULCER: ICD-10-CM

## 2025-05-07 PROCEDURE — 3077F SYST BP >= 140 MM HG: CPT | Performed by: NURSE PRACTITIONER

## 2025-05-07 PROCEDURE — 3008F BODY MASS INDEX DOCD: CPT | Performed by: NURSE PRACTITIONER

## 2025-05-07 PROCEDURE — 1159F MED LIST DOCD IN RCRD: CPT | Performed by: NURSE PRACTITIONER

## 2025-05-07 PROCEDURE — 3078F DIAST BP <80 MM HG: CPT | Performed by: NURSE PRACTITIONER

## 2025-05-07 PROCEDURE — 1160F RVW MEDS BY RX/DR IN RCRD: CPT | Performed by: NURSE PRACTITIONER

## 2025-05-07 PROCEDURE — 99203 OFFICE O/P NEW LOW 30 MIN: CPT | Performed by: NURSE PRACTITIONER

## 2025-05-07 RX ORDER — SODIUM, POTASSIUM,MAG SULFATES 17.5-3.13G
SOLUTION, RECONSTITUTED, ORAL ORAL
Qty: 354 ML | Refills: 0 | Status: SHIPPED | OUTPATIENT
Start: 2025-05-07

## 2025-05-07 NOTE — PROGRESS NOTES
" Subjective   Patient ID: Rupa Pinedo is a 69 y.o. female who presents for Abdominal Pain (Pt had ED visit on 05/01/2025. Pt states she ate an egg salad sandwich 3 days before she went to the ED, she started having abdominal pain, nausea, BRBPR when she wiped and there was dark tarry stool. Last EGD 02/2023. No hx of colonoscopy).  HPI  Patient was seen in the ED on 5/1/2025 with concern for dark stools.  She was discharged home and it was recommended she follow-up with GI.    Patient presents to the GI clinic with complaints of painless rectal bleeding and dark stools that started after eating an egg salad sandwich on April 29.  She started to experience severe abdominal cramping that felt like \"razor blades\" 1 hour after eating the sandwich followed by chills and nausea.  The following day she had hematochezia with small stools.  The following days she would experience red blood and dark stool.  No longer experiencing overt GI bleeding, but still experiencing lower abdominal cramping.  Weight and appetite are stable.  Prior to her bowel habit changes she would typically experience irregular bowel movements, but feels that 1 side of her stool is always flat.  No family history of CRC or IBD.  She is a smoker.  No alcohol or NSAIDs.  No previous colonoscopy, but has negative Cologuard January, 2025.    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                         - LA Grade A esophagitis.                         - 2 cm hiatal hernia.                         - Gastroesophageal flap valve classified as Hill Grade                          III (minimal fold, loose to endoscope, hiatal hernia                          likely).                         - Erythematous mucosa in the stomach.           "               - Oozing duodenal ulcer with oozing hemorrhage                          (Gallo Class Ib). Injected. Clips (MR conditional)                          were placed.                         - Non-bleeding duodenal ulcer with a flat pigmented                          spot (Gallo Class IIc).                         - Non-bleeding duodenal ulcers with a clean ulcer base                          (Gallo Class III).  Medications Ordered Prior to Encounter[1]     Review of Systems   All other systems reviewed and are negative.      Objective   Physical Exam  Vitals reviewed.   Constitutional:       General: She is awake. She is not in acute distress.     Appearance: Normal appearance. She is not ill-appearing, toxic-appearing or diaphoretic.   HENT:      Head: Normocephalic and atraumatic.   Eyes:      Conjunctiva/sclera: Conjunctivae normal.   Cardiovascular:      Rate and Rhythm: Normal rate and regular rhythm.      Heart sounds: Normal heart sounds. No murmur heard.  Pulmonary:      Effort: Pulmonary effort is normal. No respiratory distress.      Breath sounds: Rales present.   Abdominal:      General: Abdomen is protuberant. Bowel sounds are normal.      Palpations: Abdomen is soft. There is no hepatomegaly.      Tenderness: There is no abdominal tenderness.   Musculoskeletal:         General: Normal range of motion.      Cervical back: Normal range of motion.      Right lower leg: No edema.      Left lower leg: No edema.   Skin:     General: Skin is warm and dry.      Coloration: Skin is not jaundiced or pale.   Neurological:      General: No focal deficit present.      Mental Status: She is alert and oriented to person, place, and time.      Motor: No weakness.      Gait: Gait normal.   Psychiatric:         Mood and Affect: Mood normal.         Behavior: Behavior is cooperative.       /72 (BP Location: Left arm, Patient Position: Sitting, BP Cuff Size: Large adult)   Pulse 76   Resp 18   Ht  "1.651 m (5' 5\")   Wt 90.6 kg (199 lb 11.2 oz)   SpO2 96%   BMI 33.23 kg/m²      Lab Results   Component Value Date    WBC 8.2 05/01/2025    HGB 17.1 (H) 05/01/2025    HCT 52.8 (H) 05/01/2025    MCV 88 05/01/2025     05/01/2025     Results from last 7 days   Lab Units 05/01/25  1134   SODIUM mmol/L 140   POTASSIUM mmol/L 3.5   CHLORIDE mmol/L 101   CO2 mmol/L 31   BUN mg/dL 16   CREATININE mg/dL 0.86   CALCIUM mg/dL 9.5   PROTEIN TOTAL g/dL 8.0   BILIRUBIN TOTAL mg/dL 0.5   ALK PHOS U/L 49   ALT U/L 22   AST U/L 26   GLUCOSE mg/dL 88       No results found for: \"AFP\"  Lab Results   Component Value Date    TSH 1.00 01/10/2025         Assessment/Plan   Diagnoses and all orders for this visit:  BRBPR (bright red blood per rectum)  -     Colonoscopy Diagnostic; Future  -     sodium,potassium,mag sulfates (Suprep) 17.5-3.13-1.6 gram solution; Take one bottle twice as directed by the prep instructions  Dark stools  -     Esophagogastroduodenoscopy (EGD); Future  -     Colonoscopy Diagnostic; Future  -     sodium,potassium,mag sulfates (Suprep) 17.5-3.13-1.6 gram solution; Take one bottle twice as directed by the prep instructions  Generalized abdominal pain  -     Esophagogastroduodenoscopy (EGD); Future  -     Colonoscopy Diagnostic; Future  -     sodium,potassium,mag sulfates (Suprep) 17.5-3.13-1.6 gram solution; Take one bottle twice as directed by the prep instructions  History of peptic ulcer  -     Esophagogastroduodenoscopy (EGD); Future  69-year-old female with complaints of bright red blood per rectum (painless), lower abdominal cramping that started out with generalized abdominal pain and dark stools after eating an egg salad sandwich in April, 2025.  Her symptoms led her to the ED with hemoglobin of 17.1.  She was discharged home and it was recommended she follow-up with GI.  The patient had an upper endoscopy in 2023 while inpatient with findings of peptic ulcer disease.  Risk factors include smoking " and daily aspirin use.  No other NSAIDs or alcohol.  No previous colonoscopy, but had negative Cologuard in January, 2025.  Currently there is no overt GI bleeding, but she continues to experience lower abdominal cramping.  At this time I recommend EGD and colonoscopy to rule out organic etiology to her complaints.  Patient to be scheduled in a hospital setting due to her multiple comorbidities/complex health history.  She will follow-up after her scopes to review the results.         Rae Chavarria CNP       [1]   Current Outpatient Medications on File Prior to Visit   Medication Sig Dispense Refill    aspirin 81 mg EC tablet Take 1 tablet (81 mg) by mouth once daily.      atorvastatin (Lipitor) 80 mg tablet Take 1 tablet (80 mg) by mouth once daily.      cholecalciferol (Vitamin D3) 50 mcg (2,000 unit) capsule Take 1 capsule (50 mcg) by mouth once daily.      metoprolol succinate XL (Toprol-XL) 25 mg 24 hr tablet TAKE 1 TABLET (25 MG) BY MOUTH DAILY DO NOT CRUSH OR CHEW 90 tablet 3    nitroglycerin (Nitrostat) 0.4 mg SL tablet Place 1 tablet (0.4 mg) under the tongue if needed.      spironolactone (Aldactone) 25 mg tablet Take 0.5 tablets (12.5 mg) by mouth once daily.      triamcinolone (Kenalog) 0.025 % cream Apply 1 Application topically 2 times a day.      triamcinolone (Kenalog) 0.1 % cream APPLY TOPICALLY 2 TIMES A DAY. APPLY TO AFFECTED AREA 1-2 TIMES DAILY AS NEEDED. AVOID FACE AND GROIN. 30 g 2     No current facility-administered medications on file prior to visit.

## 2025-05-15 ENCOUNTER — CLINICAL SUPPORT (OUTPATIENT)
Dept: PREADMISSION TESTING | Facility: HOSPITAL | Age: 70
End: 2025-05-15
Payer: MEDICARE

## 2025-05-15 VITALS — BODY MASS INDEX: 33.24 KG/M2 | WEIGHT: 199.52 LBS | HEIGHT: 65 IN

## 2025-05-15 RX ORDER — IBUPROFEN 200 MG
1 TABLET ORAL DAILY
COMMUNITY

## 2025-05-15 RX ORDER — ALENDRONATE SODIUM 70 MG/1
70 TABLET ORAL
COMMUNITY
Start: 2025-03-26

## 2025-05-15 NOTE — PREPROCEDURE INSTRUCTIONS

## 2025-05-28 ENCOUNTER — APPOINTMENT (OUTPATIENT)
Dept: GASTROENTEROLOGY | Facility: HOSPITAL | Age: 70
End: 2025-05-28
Payer: MEDICARE

## 2025-05-28 DIAGNOSIS — I11.0 HYPERTENSIVE HEART DISEASE WITH HEART FAILURE: ICD-10-CM

## 2025-05-28 DIAGNOSIS — I10 ESSENTIAL HYPERTENSION: ICD-10-CM

## 2025-05-28 RX ORDER — SPIRONOLACTONE 25 MG/1
25 TABLET ORAL DAILY
Qty: 90 TABLET | Refills: 3 | Status: SHIPPED | OUTPATIENT
Start: 2025-05-28

## 2025-05-28 RX ORDER — LISINOPRIL 10 MG/1
10 TABLET ORAL DAILY
Qty: 90 TABLET | Refills: 3 | Status: SHIPPED | OUTPATIENT
Start: 2025-05-28

## 2025-06-19 ENCOUNTER — APPOINTMENT (OUTPATIENT)
Dept: PRIMARY CARE | Facility: CLINIC | Age: 70
End: 2025-06-19
Payer: MEDICARE

## 2025-06-19 VITALS
RESPIRATION RATE: 14 BRPM | HEART RATE: 72 BPM | OXYGEN SATURATION: 96 % | WEIGHT: 199 LBS | DIASTOLIC BLOOD PRESSURE: 76 MMHG | BODY MASS INDEX: 33.15 KG/M2 | SYSTOLIC BLOOD PRESSURE: 132 MMHG | HEIGHT: 65 IN

## 2025-06-19 DIAGNOSIS — K92.1 HEMATOCHEZIA: Primary | ICD-10-CM

## 2025-06-19 DIAGNOSIS — D75.1 SECONDARY POLYCYTHEMIA: ICD-10-CM

## 2025-06-19 DIAGNOSIS — R04.0 EPISTAXIS: ICD-10-CM

## 2025-06-19 DIAGNOSIS — J44.9 CHRONIC OBSTRUCTIVE PULMONARY DISEASE, UNSPECIFIED COPD TYPE (MULTI): ICD-10-CM

## 2025-06-19 DIAGNOSIS — I11.0 HYPERTENSIVE HEART DISEASE WITH HEART FAILURE: ICD-10-CM

## 2025-06-19 DIAGNOSIS — I50.32 CHRONIC DIASTOLIC (CONGESTIVE) HEART FAILURE: ICD-10-CM

## 2025-06-19 DIAGNOSIS — F17.200 NICOTINE USE DISORDER: ICD-10-CM

## 2025-06-19 PROCEDURE — 3075F SYST BP GE 130 - 139MM HG: CPT | Performed by: INTERNAL MEDICINE

## 2025-06-19 PROCEDURE — 3008F BODY MASS INDEX DOCD: CPT | Performed by: INTERNAL MEDICINE

## 2025-06-19 PROCEDURE — 99214 OFFICE O/P EST MOD 30 MIN: CPT | Performed by: INTERNAL MEDICINE

## 2025-06-19 PROCEDURE — 3078F DIAST BP <80 MM HG: CPT | Performed by: INTERNAL MEDICINE

## 2025-06-19 PROCEDURE — 1159F MED LIST DOCD IN RCRD: CPT | Performed by: INTERNAL MEDICINE

## 2025-06-19 ASSESSMENT — ENCOUNTER SYMPTOMS
DIAPHORESIS: 0
CHILLS: 0
BLOOD IN STOOL: 1
COUGH: 0
CONSTIPATION: 0
NAUSEA: 0
DIARRHEA: 0
SHORTNESS OF BREATH: 0
JOINT SWELLING: 0
MYALGIAS: 0
FEVER: 0
VOMITING: 0

## 2025-06-19 ASSESSMENT — PATIENT HEALTH QUESTIONNAIRE - PHQ9
2. FEELING DOWN, DEPRESSED OR HOPELESS: NOT AT ALL
1. LITTLE INTEREST OR PLEASURE IN DOING THINGS: NOT AT ALL
SUM OF ALL RESPONSES TO PHQ9 QUESTIONS 1 AND 2: 0

## 2025-06-19 NOTE — PATIENT INSTRUCTIONS
WE DISCUSSED HOLD PRESSURE OVER BRIDGE OF NOSE IF NOSE BLEED.  ICE CAN ALSO HELP IT    2.  PLEASE CALL IF REFILLS ARE NEEDED    3.  LABS LOOK STELLAR FROM MAY    4.  PLEASE LET ME KNOW IF ANY THING I CAN DO TO HELP    5.  I SUSPECT YOU HAVE AN INTERMITTENTLY BLEEDING INTERNAL HEMORRHOID AS A CAUSE OF BLEEDING.  THE COLONOSCOPY WILL SEE THIS    6.  THE FACT THAT YOU HAVE BLED A FEW TIMES AND YOUR BLOOD COUNT IS ACTUALLY HIGH (FROM SMOKING) IS A GOOD SIGN THAT THERE IS NO TERRIBLE BLEEDING THING OCCURRING INSIDE OF YOU    7.  FOLLOW UP 6 MONTHS OR AS NEEDED.

## 2025-06-19 NOTE — PROGRESS NOTES
"Subjective   Rupa Pinedo is a 69 y.o. female who presents for  FOLLOW  UP  CARDIOLOGIST STOPPED LISINOPRIL     WENT TO ER IN [facility] REFERRED FOR EGD AND COLONOSCOPY IN MAY SAYS SHE ENDED UP HAVING NOSE BLEEDS PRIOR TO PROCEDURE  DIDN'T HEAR BACK SOON ENOUGH FROM PROVIDER ABOUT PREP THINKING THEY WOULD CX PROCEDURES SHE DID NOT PREP RESCHEDULED TILL AUGUST      HPI   FELT ODYNOPHAGIA SUDDENLY IN [facility],, AFTER LAID DOWN WAS MUCH WORSE    HAD SOME RECTAL BLEEDING    WAITED UNTIL NEXT DAY, STILL BLOODY STOOL, WAITED ANOTHER DAY, STILL BLOOD, THEN WENT TO ED    SAW GI, PLANNED UPPER/LOWER ENDOSCOPIES, SCHEDULED PROCEDURES DR. SHEEHAN.      HAD NOSE BLEEDS 5 DAY BEFORE PROCEDURES    CANCELED THE SCOPES, NOW RESCHEDULED FOR AUGUST    HAS HAD NEG COLOGUARD    PLAN DRIVING TO AN EVENT IN [facility] THIS SUMMER, THEN TO [place] TO SEE HER FAMILY    REMAINS A SMOKER    Review of Systems   Constitutional:  Negative for chills, diaphoresis and fever.   Respiratory:  Negative for cough and shortness of breath.    Cardiovascular:  Negative for chest pain and leg swelling.   Gastrointestinal:  Positive for blood in stool. Negative for constipation, diarrhea, nausea and vomiting.   Musculoskeletal:  Negative for joint swelling and myalgias.       Objective   /76   Pulse 72   Resp 14   Ht 1.651 m (5' 5\")   Wt 90.3 kg (199 lb)   SpO2 96%   BMI 33.12 kg/m²     Physical Exam  Vitals reviewed.   Constitutional:       General: She is not in acute distress.     Appearance: She is obese. She is not ill-appearing.      Comments: SLIGHT THORACIC KYPHOSIS   Cardiovascular:      Rate and Rhythm: Normal rate and regular rhythm.      Pulses: Normal pulses.      Heart sounds:      No friction rub. No gallop.   Pulmonary:      Breath sounds: Normal breath sounds. No wheezing, rhonchi or rales.   Abdominal:      General: Abdomen is flat. Bowel sounds are normal.      Palpations: Abdomen is soft.      Tenderness: There is no " guarding or rebound.   Musculoskeletal:         General: No swelling.      Right lower leg: No edema.      Left lower leg: No edema.   Skin:     Coloration: Skin is not pale.         Assessment/Plan   Problem List Items Addressed This Visit       Nicotine use disorder    Hypertensive heart disease with heart failure    FOLLOWS WITH CARDIOLOGY         Chronic obstructive pulmonary disease, unspecified COPD type (Multi)    Hematochezia - Primary    Epistaxis    Secondary polycythemia     Patient Instructions    WE DISCUSSED HOLD PRESSURE OVER BRIDGE OF NOSE IF NOSE BLEED.  ICE CAN ALSO HELP IT    2.  PLEASE CALL IF REFILLS ARE NEEDED    3.  LABS LOOK STELLAR FROM MAY    4.  PLEASE LET ME KNOW IF ANY THING I CAN DO TO HELP    5.  I SUSPECT YOU HAVE AN INTERMITTENTLY BLEEDING INTERNAL HEMORRHOID AS A CAUSE OF BLEEDING.  THE COLONOSCOPY WILL SEE THIS    6.  THE FACT THAT YOU HAVE BLED A FEW TIMES AND YOUR BLOOD COUNT IS ACTUALLY HIGH (FROM SMOKING) IS A GOOD SIGN THAT THERE IS NO TERRIBLE BLEEDING THING OCCURRING INSIDE OF YOU    7.  FOLLOW UP 6 MONTHS OR AS NEEDED.

## 2025-06-25 ENCOUNTER — APPOINTMENT (OUTPATIENT)
Dept: GASTROENTEROLOGY | Facility: CLINIC | Age: 70
End: 2025-06-25
Payer: MEDICARE

## 2025-06-25 NOTE — ASSESSMENT & PLAN NOTE
Goal potassium around 4, earlier this week up to 3.6, repeat BMP pending for today.    Alert-The patient is alert, awake and responds to voice. The patient is oriented to time, place, and person. The triage nurse is able to obtain subjective information.

## 2025-07-02 NOTE — PROGRESS NOTES
Complaint:   No chief complaint on file.     History Of Present Illness:    Rupa Pinedo is a 69 y.o. 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    Overall doing well.  Denies any exertional chest pain or shortness of breath.  Does have intermittent leg swelling but it typically improves after a day or so.  She was having controlled blood pressures on the lower dose of spironolactone in the past however the leg started swelling more so she went back up to the 25 mg daily.    On April 29 she ate something and then had severe abdominal pain.  She felt like she was swallowing glass.  She was nauseous and laid down.  This eventually passed.  The next day she felt better but then developed blood in the rectum.  She went to the ER after couple of days.  GI workup as an outpatient was recommended.  She was scheduled to have an EGD and colonoscopy on the Wednesday after Memorial Day however on Memorial Day she developed nosebleeds.  She decided to hold off and has rescheduled the EGD and colonoscopy for August.    She has had occasional streaks of blood in the stool but believes that this is much milder than it was previously.    Patient seen by Tracy Schwab 7/3/2024.  I reviewed that note.    She used to work at [employer] as a .    Ambulatory monitor August 2023: 48-hour monitor.  Predominantly sinus rhythm.  Nonsustained episodes of supraventricular tachycardia.  Longest was 12 beats and fastest was 3 beats at a rate of 131 bpm.  No evidence of atrial fibrillation.    Limited echocardiogram 10/10/2022: Normal LV size and function.    3V CABG 10/5/2022: LIMA to diagonal, ANTONIA to LAD and VG to ramus.       Allergies:  Augmentin [amoxicillin-pot clavulanate], Blueberry, Flavoring agent, Metronidazole, Cephalexin, Ciprofloxacin,  "Clarithromycin, Clindamycin, Irbesartan-hydrochlorothiazide, Moxifloxacin, Sulfa (sulfonamide antibiotics), and Vancomycin    Outpatient Medications:  Current Outpatient Medications   Medication Instructions    alendronate (FOSAMAX) 70 mg, Every 7 days    aspirin 81 mg, Daily    atorvastatin (LIPITOR) 80 mg, Daily RT    cholecalciferol (VITAMIN D3) 50 mcg, oral, Daily    lactobacillus acidophilus (Acidophilus) capsule 1 capsule, Daily    metoprolol succinate XL (TOPROL-XL) 25 mg, oral, Daily, Swallow whole. Do not chew or crush    nitroglycerin (NITROSTAT) 0.4 mg, As needed    spironolactone (ALDACTONE) 25 mg, oral, Daily    triamcinolone (Kenalog) 0.025 % cream 1 Application, 2 times daily    triamcinolone (Kenalog) 0.1 % cream Topical, 2 times daily, Apply to affected area 1-2 times daily as needed. Avoid face and groin.       Last Recorded Vitals:  Visit Vitals  /52 (BP Location: Right arm, Patient Position: Sitting)   Pulse 67   Ht 1.651 m (5' 5\")   Wt 90.3 kg (199 lb)   SpO2 96%   BMI 33.12 kg/m²   Smoking Status Every Day   BSA 2.04 m²      LASTWT(3):   Wt Readings from Last 3 Encounters:   07/03/25 90.3 kg (199 lb)   06/19/25 90.3 kg (199 lb)   05/15/25 90.5 kg (199 lb 8.3 oz)       Physical Exam:  In general: alert and in no acute distress.   HEENT: Carotid upstrokes normal with no bruits. JVP is normal.   Pulmonary: mild expir wheeze rightg lung.  Cardiovascular: S1,S2, regular. No appreciable murmurs, rubs or gallops.   Lower extremities: Warm. 2+ distal pulses.  Trace bilateral lower extremity edema.     Last Labs:  CBC -  Recent Labs     05/01/25  1134 01/10/25  0708   WBC 8.2 7.7   HGB 17.1* 15.5   HCT 52.8* 47.6*    216   MCV 88 91       CMP -  Recent Labs     05/01/25  1134 01/10/25  0708    138   K 3.5 4.8    105   CO2 31 25   ANIONGAP 12 13   BUN 16 28*   CREATININE 0.86 1.06*   EGFR 72 56*   MG 1.99  --      Recent Labs     05/01/25  1134 01/10/25  0708   ALBUMIN 4.5 4.3 " "  ALKPHOS 49 53   ALT 22 20   AST 26 27   BILITOT 0.5 0.7       LIPID PANEL -   Recent Labs     01/10/25  0708   CHOL 104   LDLCALC 44   HDL 45.8   TRIG 70       No results for input(s): \"BNP\", \"HGBA1C\" in the last 8760 hours.          Assessment/Plan   1) CAD: 3V CABG October 2022 at [facility].  Complicated by sternal wound infection requiring redo surgery.  Has had no exertional chest pain or shortness of breath.  Continue with baby aspirin and Toprol-XL 25 mg daily.    2) dyslipidemia: LDL has been very well-controlled.  Continue atorvastatin 80 mg daily.    3) paroxysmal atrial fibrillation: No evidence of recurrence by ambulatory monitoring.  No symptomatic palpitations at this time.  Continue to observe.    4) hypertension: Blood pressure has been on the lower side.  We tried decreasing the spironolactone but her leg started swelling so she went back up to 25 mg daily.  She denies any lightheadedness.    If the blood pressure becomes lower in the future we can always consider decreasing or stopping the spironolactone and simply using a loop diuretic such as furosemide for leg swelling.    5) follow-up: 12 months or sooner if needed      Julio Monique MD  "

## 2025-07-03 ENCOUNTER — OFFICE VISIT (OUTPATIENT)
Dept: CARDIOLOGY | Facility: CLINIC | Age: 70
End: 2025-07-03
Payer: MEDICARE

## 2025-07-03 VITALS
OXYGEN SATURATION: 96 % | HEIGHT: 65 IN | HEART RATE: 67 BPM | BODY MASS INDEX: 33.15 KG/M2 | WEIGHT: 199 LBS | SYSTOLIC BLOOD PRESSURE: 104 MMHG | DIASTOLIC BLOOD PRESSURE: 52 MMHG

## 2025-07-03 DIAGNOSIS — E78.5 DYSLIPIDEMIA: ICD-10-CM

## 2025-07-03 DIAGNOSIS — G47.20 SLEEP PATTERN DISTURBANCE: ICD-10-CM

## 2025-07-03 DIAGNOSIS — I48.0 PAROXYSMAL ATRIAL FIBRILLATION (MULTI): ICD-10-CM

## 2025-07-03 DIAGNOSIS — I10 ESSENTIAL HYPERTENSION: ICD-10-CM

## 2025-07-03 DIAGNOSIS — I25.10 CORONARY ARTERY DISEASE INVOLVING NATIVE CORONARY ARTERY OF NATIVE HEART WITHOUT ANGINA PECTORIS: Primary | ICD-10-CM

## 2025-07-03 PROCEDURE — 3008F BODY MASS INDEX DOCD: CPT | Performed by: INTERNAL MEDICINE

## 2025-07-03 PROCEDURE — 99214 OFFICE O/P EST MOD 30 MIN: CPT | Performed by: INTERNAL MEDICINE

## 2025-07-03 PROCEDURE — 3078F DIAST BP <80 MM HG: CPT | Performed by: INTERNAL MEDICINE

## 2025-07-03 PROCEDURE — 1160F RVW MEDS BY RX/DR IN RCRD: CPT | Performed by: INTERNAL MEDICINE

## 2025-07-03 PROCEDURE — 99212 OFFICE O/P EST SF 10 MIN: CPT

## 2025-07-03 PROCEDURE — 3074F SYST BP LT 130 MM HG: CPT | Performed by: INTERNAL MEDICINE

## 2025-07-03 PROCEDURE — 1159F MED LIST DOCD IN RCRD: CPT | Performed by: INTERNAL MEDICINE

## 2025-08-05 ENCOUNTER — CLINICAL SUPPORT (OUTPATIENT)
Dept: PREADMISSION TESTING | Facility: HOSPITAL | Age: 70
End: 2025-08-05
Payer: MEDICARE

## 2025-08-05 VITALS — WEIGHT: 196 LBS | HEIGHT: 65 IN | BODY MASS INDEX: 32.65 KG/M2

## 2025-08-05 NOTE — PREPROCEDURE INSTRUCTIONS

## 2025-08-15 ENCOUNTER — ANESTHESIA EVENT (OUTPATIENT)
Dept: GASTROENTEROLOGY | Facility: HOSPITAL | Age: 70
End: 2025-08-15
Payer: MEDICARE

## 2025-08-15 ENCOUNTER — HOSPITAL ENCOUNTER (OUTPATIENT)
Dept: GASTROENTEROLOGY | Facility: HOSPITAL | Age: 70
Discharge: HOME | End: 2025-08-15
Payer: MEDICARE

## 2025-08-15 ENCOUNTER — ANESTHESIA (OUTPATIENT)
Dept: GASTROENTEROLOGY | Facility: HOSPITAL | Age: 70
End: 2025-08-15
Payer: MEDICARE

## 2025-08-15 VITALS
OXYGEN SATURATION: 96 % | HEIGHT: 65 IN | HEART RATE: 75 BPM | TEMPERATURE: 97.3 F | SYSTOLIC BLOOD PRESSURE: 136 MMHG | DIASTOLIC BLOOD PRESSURE: 80 MMHG | BODY MASS INDEX: 31.77 KG/M2 | RESPIRATION RATE: 16 BRPM | WEIGHT: 190.7 LBS

## 2025-08-15 DIAGNOSIS — R10.84 GENERALIZED ABDOMINAL PAIN: ICD-10-CM

## 2025-08-15 DIAGNOSIS — R19.5 DARK STOOLS: ICD-10-CM

## 2025-08-15 DIAGNOSIS — Z87.11 HISTORY OF PEPTIC ULCER: ICD-10-CM

## 2025-08-15 DIAGNOSIS — K62.5 BRBPR (BRIGHT RED BLOOD PER RECTUM): ICD-10-CM

## 2025-08-15 DIAGNOSIS — K27.9 PUD (PEPTIC ULCER DISEASE): Primary | ICD-10-CM

## 2025-08-15 PROCEDURE — 3700000002 HC GENERAL ANESTHESIA TIME - EACH INCREMENTAL 1 MINUTE

## 2025-08-15 PROCEDURE — 7100000010 HC PHASE TWO TIME - EACH INCREMENTAL 1 MINUTE

## 2025-08-15 PROCEDURE — 2500000001 HC RX 250 WO HCPCS SELF ADMINISTERED DRUGS (ALT 637 FOR MEDICARE OP): Performed by: STUDENT IN AN ORGANIZED HEALTH CARE EDUCATION/TRAINING PROGRAM

## 2025-08-15 PROCEDURE — 2500000004 HC RX 250 GENERAL PHARMACY W/ HCPCS (ALT 636 FOR OP/ED): Performed by: NURSE ANESTHETIST, CERTIFIED REGISTERED

## 2025-08-15 PROCEDURE — 45385 COLONOSCOPY W/LESION REMOVAL: CPT | Performed by: STUDENT IN AN ORGANIZED HEALTH CARE EDUCATION/TRAINING PROGRAM

## 2025-08-15 PROCEDURE — 3700000001 HC GENERAL ANESTHESIA TIME - INITIAL BASE CHARGE

## 2025-08-15 PROCEDURE — 43239 EGD BIOPSY SINGLE/MULTIPLE: CPT | Performed by: STUDENT IN AN ORGANIZED HEALTH CARE EDUCATION/TRAINING PROGRAM

## 2025-08-15 PROCEDURE — 7100000009 HC PHASE TWO TIME - INITIAL BASE CHARGE

## 2025-08-15 PROCEDURE — 45380 COLONOSCOPY AND BIOPSY: CPT | Performed by: STUDENT IN AN ORGANIZED HEALTH CARE EDUCATION/TRAINING PROGRAM

## 2025-08-15 RX ORDER — SODIUM CHLORIDE, SODIUM LACTATE, POTASSIUM CHLORIDE, CALCIUM CHLORIDE 600; 310; 30; 20 MG/100ML; MG/100ML; MG/100ML; MG/100ML
INJECTION, SOLUTION INTRAVENOUS CONTINUOUS PRN
Status: DISCONTINUED | OUTPATIENT
Start: 2025-08-15 | End: 2025-08-15

## 2025-08-15 RX ORDER — OMEPRAZOLE 40 MG/1
40 CAPSULE, DELAYED RELEASE ORAL
Qty: 60 CAPSULE | Refills: 1 | Status: SHIPPED | OUTPATIENT
Start: 2025-08-15

## 2025-08-15 RX ORDER — PROPOFOL 10 MG/ML
INJECTION, EMULSION INTRAVENOUS AS NEEDED
Status: DISCONTINUED | OUTPATIENT
Start: 2025-08-15 | End: 2025-08-15

## 2025-08-15 RX ORDER — DEXTROMETHORPHAN/PSEUDOEPHED 2.5-7.5/.8
DROPS ORAL AS NEEDED
Status: COMPLETED | OUTPATIENT
Start: 2025-08-15 | End: 2025-08-15

## 2025-08-15 RX ADMIN — PROPOFOL 200 MG: 10 INJECTION, EMULSION INTRAVENOUS at 09:27

## 2025-08-15 RX ADMIN — PROPOFOL 200 MG: 10 INJECTION, EMULSION INTRAVENOUS at 09:10

## 2025-08-15 RX ADMIN — SODIUM CHLORIDE, POTASSIUM CHLORIDE, SODIUM LACTATE AND CALCIUM CHLORIDE: 600; 310; 30; 20 INJECTION, SOLUTION INTRAVENOUS at 09:02

## 2025-08-15 RX ADMIN — SIMETHICONE 333 MG: 20 EMULSION ORAL at 09:17

## 2025-08-15 SDOH — HEALTH STABILITY: MENTAL HEALTH: CURRENT SMOKER: 1

## 2025-08-15 ASSESSMENT — PAIN SCALES - GENERAL
PAINLEVEL_OUTOF10: 0 - NO PAIN
PAIN_LEVEL: 0
PAINLEVEL_OUTOF10: 0 - NO PAIN
PAINLEVEL_OUTOF10: 0 - NO PAIN

## 2025-08-15 ASSESSMENT — PAIN - FUNCTIONAL ASSESSMENT
PAIN_FUNCTIONAL_ASSESSMENT: 0-10

## 2025-08-27 ENCOUNTER — TELEPHONE (OUTPATIENT)
Dept: PRIMARY CARE | Facility: CLINIC | Age: 70
End: 2025-08-27
Payer: MEDICARE

## 2025-08-27 DIAGNOSIS — E78.5 HYPERLIPIDEMIA, UNSPECIFIED HYPERLIPIDEMIA TYPE: ICD-10-CM

## 2025-08-27 LAB
LABORATORY COMMENT REPORT: NORMAL
PATH REPORT.FINAL DX SPEC: NORMAL
PATH REPORT.GROSS SPEC: NORMAL
PATH REPORT.RELEVANT HX SPEC: NORMAL
PATH REPORT.TOTAL CANCER: NORMAL

## 2025-08-27 RX ORDER — ATORVASTATIN CALCIUM 80 MG/1
80 TABLET, FILM COATED ORAL
Qty: 90 TABLET | Refills: 3 | Status: SHIPPED | OUTPATIENT
Start: 2025-08-27

## 2025-09-15 ENCOUNTER — APPOINTMENT (OUTPATIENT)
Dept: GASTROENTEROLOGY | Facility: CLINIC | Age: 70
End: 2025-09-15
Payer: MEDICARE

## 2025-09-17 ENCOUNTER — APPOINTMENT (OUTPATIENT)
Facility: CLINIC | Age: 70
End: 2025-09-17
Payer: MEDICARE

## 2025-12-10 ENCOUNTER — APPOINTMENT (OUTPATIENT)
Dept: PRIMARY CARE | Facility: CLINIC | Age: 70
End: 2025-12-10
Payer: MEDICARE

## 2025-12-11 ENCOUNTER — APPOINTMENT (OUTPATIENT)
Dept: PRIMARY CARE | Facility: CLINIC | Age: 70
End: 2025-12-11
Payer: MEDICARE